# Patient Record
Sex: FEMALE | Race: WHITE | Employment: FULL TIME | URBAN - METROPOLITAN AREA
[De-identification: names, ages, dates, MRNs, and addresses within clinical notes are randomized per-mention and may not be internally consistent; named-entity substitution may affect disease eponyms.]

---

## 2017-03-15 ENCOUNTER — TELEPHONE (OUTPATIENT)
Dept: BARIATRICS/WEIGHT MGMT | Age: 37
End: 2017-03-15

## 2017-05-02 ENCOUNTER — TELEPHONE (OUTPATIENT)
Dept: BARIATRICS/WEIGHT MGMT | Age: 37
End: 2017-05-02

## 2017-05-09 ENCOUNTER — OFFICE VISIT (OUTPATIENT)
Dept: BARIATRICS/WEIGHT MGMT | Age: 37
End: 2017-05-09

## 2017-05-09 VITALS
SYSTOLIC BLOOD PRESSURE: 120 MMHG | HEART RATE: 73 BPM | WEIGHT: 243.2 LBS | HEIGHT: 66 IN | DIASTOLIC BLOOD PRESSURE: 80 MMHG | RESPIRATION RATE: 16 BRPM | BODY MASS INDEX: 39.08 KG/M2

## 2017-05-09 DIAGNOSIS — R06.83 SNORING: ICD-10-CM

## 2017-05-09 DIAGNOSIS — I10 HYPERTENSION, ESSENTIAL: ICD-10-CM

## 2017-05-09 DIAGNOSIS — E66.9 OBESITY (BMI 35.0-39.9 WITHOUT COMORBIDITY): ICD-10-CM

## 2017-05-09 DIAGNOSIS — E66.9 OBESITY (BMI 35.0-39.9 WITHOUT COMORBIDITY): Primary | ICD-10-CM

## 2017-05-09 DIAGNOSIS — E28.2 PCOS (POLYCYSTIC OVARIAN SYNDROME): ICD-10-CM

## 2017-05-09 PROBLEM — F43.10 POST TRAUMATIC STRESS DISORDER (PTSD): Status: ACTIVE | Noted: 2017-05-09

## 2017-05-09 PROBLEM — F41.9 ANXIETY: Status: ACTIVE | Noted: 2017-05-09

## 2017-05-09 LAB
BASOPHILS ABSOLUTE: 0.1 K/UL (ref 0–0.2)
BASOPHILS RELATIVE PERCENT: 1.3 %
EOSINOPHILS ABSOLUTE: 0.1 K/UL (ref 0–0.6)
EOSINOPHILS RELATIVE PERCENT: 1.7 %
HCT VFR BLD CALC: 45.7 % (ref 36–48)
HEMOGLOBIN: 15.2 G/DL (ref 12–16)
LYMPHOCYTES ABSOLUTE: 2 K/UL (ref 1–5.1)
LYMPHOCYTES RELATIVE PERCENT: 33 %
MCH RBC QN AUTO: 29.5 PG (ref 26–34)
MCHC RBC AUTO-ENTMCNC: 33.2 G/DL (ref 31–36)
MCV RBC AUTO: 88.8 FL (ref 80–100)
MONOCYTES ABSOLUTE: 0.5 K/UL (ref 0–1.3)
MONOCYTES RELATIVE PERCENT: 7.9 %
NEUTROPHILS ABSOLUTE: 3.3 K/UL (ref 1.7–7.7)
NEUTROPHILS RELATIVE PERCENT: 56.1 %
PDW BLD-RTO: 13.8 % (ref 12.4–15.4)
PLATELET # BLD: 310 K/UL (ref 135–450)
PMV BLD AUTO: 9 FL (ref 5–10.5)
RBC # BLD: 5.15 M/UL (ref 4–5.2)
TSH REFLEX: 1.55 UIU/ML (ref 0.27–4.2)
VITAMIN D 25-HYDROXY: 31.6 NG/ML
WBC # BLD: 5.9 K/UL (ref 4–11)

## 2017-05-09 PROCEDURE — 99244 OFF/OP CNSLTJ NEW/EST MOD 40: CPT | Performed by: SURGERY

## 2017-05-10 LAB
A/G RATIO: 1.7 (ref 1.1–2.2)
ALBUMIN SERPL-MCNC: 4.9 G/DL (ref 3.4–5)
ALP BLD-CCNC: 80 U/L (ref 40–129)
ALT SERPL-CCNC: 24 U/L (ref 10–40)
ANION GAP SERPL CALCULATED.3IONS-SCNC: 19 MMOL/L (ref 3–16)
AST SERPL-CCNC: 20 U/L (ref 15–37)
BILIRUB SERPL-MCNC: 0.3 MG/DL (ref 0–1)
BUN BLDV-MCNC: 13 MG/DL (ref 7–20)
CALCIUM SERPL-MCNC: 9.3 MG/DL (ref 8.3–10.6)
CHLORIDE BLD-SCNC: 98 MMOL/L (ref 99–110)
CHOLESTEROL, TOTAL: 233 MG/DL (ref 0–199)
CO2: 23 MMOL/L (ref 21–32)
CREAT SERPL-MCNC: 0.7 MG/DL (ref 0.6–1.1)
ESTIMATED AVERAGE GLUCOSE: 102.5 MG/DL
FOLATE: >20 NG/ML (ref 4.78–24.2)
GFR AFRICAN AMERICAN: >60
GFR NON-AFRICAN AMERICAN: >60
GLOBULIN: 2.9 G/DL
GLUCOSE BLD-MCNC: 79 MG/DL (ref 70–99)
HBA1C MFR BLD: 5.2 %
HDLC SERPL-MCNC: 45 MG/DL (ref 40–60)
IRON SATURATION: 21 % (ref 15–50)
IRON: 67 UG/DL (ref 37–145)
LDL CHOLESTEROL CALCULATED: 159 MG/DL
POTASSIUM SERPL-SCNC: 4.5 MMOL/L (ref 3.5–5.1)
SODIUM BLD-SCNC: 140 MMOL/L (ref 136–145)
TOTAL IRON BINDING CAPACITY: 318 UG/DL (ref 260–445)
TOTAL PROTEIN: 7.8 G/DL (ref 6.4–8.2)
TRIGL SERPL-MCNC: 144 MG/DL (ref 0–150)
VITAMIN B-12: 734 PG/ML (ref 211–911)
VLDLC SERPL CALC-MCNC: 29 MG/DL

## 2017-05-11 LAB — H PYLORI ANTIGEN STOOL: NEGATIVE

## 2017-05-30 ENCOUNTER — TELEPHONE (OUTPATIENT)
Dept: BARIATRICS/WEIGHT MGMT | Age: 37
End: 2017-05-30

## 2017-06-12 ENCOUNTER — OFFICE VISIT (OUTPATIENT)
Dept: BARIATRICS/WEIGHT MGMT | Age: 37
End: 2017-06-12

## 2017-06-12 VITALS
BODY MASS INDEX: 38.09 KG/M2 | WEIGHT: 237 LBS | HEIGHT: 66 IN | SYSTOLIC BLOOD PRESSURE: 122 MMHG | HEART RATE: 70 BPM | RESPIRATION RATE: 16 BRPM | DIASTOLIC BLOOD PRESSURE: 74 MMHG

## 2017-06-12 DIAGNOSIS — E28.2 PCOS (POLYCYSTIC OVARIAN SYNDROME): ICD-10-CM

## 2017-06-12 DIAGNOSIS — I10 HYPERTENSION, ESSENTIAL: ICD-10-CM

## 2017-06-12 DIAGNOSIS — E66.9 OBESITY (BMI 30-39.9): Primary | ICD-10-CM

## 2017-06-12 DIAGNOSIS — E78.5 HYPERLIPIDEMIA, UNSPECIFIED: ICD-10-CM

## 2017-06-12 PROCEDURE — 99213 OFFICE O/P EST LOW 20 MIN: CPT | Performed by: NURSE PRACTITIONER

## 2017-06-12 ASSESSMENT — ENCOUNTER SYMPTOMS
RESPIRATORY NEGATIVE: 1
EYES NEGATIVE: 1
ALLERGIC/IMMUNOLOGIC NEGATIVE: 1
GASTROINTESTINAL NEGATIVE: 1

## 2017-06-20 ENCOUNTER — TELEPHONE (OUTPATIENT)
Dept: SLEEP MEDICINE | Age: 37
End: 2017-06-20

## 2017-12-26 LAB
A/G RATIO: 1.7 (ref 1.1–2.2)
ABO/RH: NORMAL
ALBUMIN SERPL-MCNC: 4.5 G/DL (ref 3.4–5)
ALP BLD-CCNC: 52 U/L (ref 40–129)
ALT SERPL-CCNC: 31 U/L (ref 10–40)
ANION GAP SERPL CALCULATED.3IONS-SCNC: 13 MMOL/L (ref 3–16)
ANTIBODY SCREEN: NORMAL
AST SERPL-CCNC: 23 U/L (ref 15–37)
BILIRUB SERPL-MCNC: 0.3 MG/DL (ref 0–1)
BUN BLDV-MCNC: 14 MG/DL (ref 7–20)
CALCIUM SERPL-MCNC: 9.6 MG/DL (ref 8.3–10.6)
CHLORIDE BLD-SCNC: 105 MMOL/L (ref 99–110)
CO2: 25 MMOL/L (ref 21–32)
CREAT SERPL-MCNC: 0.6 MG/DL (ref 0.6–1.1)
GFR AFRICAN AMERICAN: >60
GFR NON-AFRICAN AMERICAN: >60
GLOBULIN: 2.7 G/DL
GLUCOSE BLD-MCNC: 90 MG/DL (ref 70–99)
HEPATITIS C ANTIBODY INTERPRETATION: NORMAL
POTASSIUM SERPL-SCNC: 4.2 MMOL/L (ref 3.5–5.1)
SODIUM BLD-SCNC: 143 MMOL/L (ref 136–145)
TOTAL PROTEIN: 7.2 G/DL (ref 6.4–8.2)
TSH SERPL DL<=0.05 MIU/L-ACNC: 2.02 UIU/ML (ref 0.27–4.2)
URIC ACID, SERUM: 3.7 MG/DL (ref 2.6–6)

## 2017-12-27 LAB
BASOPHILS ABSOLUTE: 0 K/UL (ref 0–0.2)
BASOPHILS RELATIVE PERCENT: 0.4 %
EOSINOPHILS ABSOLUTE: 0.3 K/UL (ref 0–0.6)
EOSINOPHILS RELATIVE PERCENT: 4.1 %
ESTIMATED AVERAGE GLUCOSE: 99.7 MG/DL
HBA1C MFR BLD: 5.1 %
HCT VFR BLD CALC: 40 % (ref 36–48)
HEMOGLOBIN: 13.5 G/DL (ref 12–16)
HEPATITIS B SURFACE ANTIGEN INTERPRETATION: NORMAL
HIV-1 AND HIV-2 ANTIBODIES: NORMAL
LYMPHOCYTES ABSOLUTE: 2.1 K/UL (ref 1–5.1)
LYMPHOCYTES RELATIVE PERCENT: 26.7 %
MCH RBC QN AUTO: 29.3 PG (ref 26–34)
MCHC RBC AUTO-ENTMCNC: 33.6 G/DL (ref 31–36)
MCV RBC AUTO: 87.2 FL (ref 80–100)
MONOCYTES ABSOLUTE: 0.7 K/UL (ref 0–1.3)
MONOCYTES RELATIVE PERCENT: 9.2 %
NEUTROPHILS ABSOLUTE: 4.7 K/UL (ref 1.7–7.7)
NEUTROPHILS RELATIVE PERCENT: 59.6 %
PDW BLD-RTO: 13 % (ref 12.4–15.4)
PLATELET # BLD: 324 K/UL (ref 135–450)
PMV BLD AUTO: 8.5 FL (ref 5–10.5)
RBC # BLD: 4.59 M/UL (ref 4–5.2)
RPR: NORMAL
RUBELLA ANTIBODY IGG: 152.7 IU/ML
WBC # BLD: 7.8 K/UL (ref 4–11)

## 2017-12-29 ENCOUNTER — HOSPITAL ENCOUNTER (OUTPATIENT)
Dept: OTHER | Age: 37
Discharge: HOME | End: 2017-12-29
Attending: OBSTETRICS & GYNECOLOGY | Admitting: OBSTETRICS & GYNECOLOGY

## 2017-12-29 LAB
24HR URINE VOLUME (ML): 2850 ML
CREATININE 24 HOUR URINE: 2.3 G/24HR (ref 0.6–1.5)
PROTEIN 24 HOUR URINE: 0.23 G/24HR

## 2018-03-09 LAB — TSH SERPL DL<=0.05 MIU/L-ACNC: 1.07 UIU/ML (ref 0.27–4.2)

## 2018-04-27 LAB
HCT VFR BLD CALC: 33.8 % (ref 36–48)
HEMOGLOBIN: 11.5 G/DL (ref 12–16)
MCH RBC QN AUTO: 29.6 PG (ref 26–34)
MCHC RBC AUTO-ENTMCNC: 34.1 G/DL (ref 31–36)
MCV RBC AUTO: 86.5 FL (ref 80–100)
PDW BLD-RTO: 13.5 % (ref 12.4–15.4)
PLATELET # BLD: 250 K/UL (ref 135–450)
PMV BLD AUTO: 8.8 FL (ref 5–10.5)
RBC # BLD: 3.9 M/UL (ref 4–5.2)
WBC # BLD: 8.5 K/UL (ref 4–11)

## 2018-06-08 LAB — TSH SERPL DL<=0.05 MIU/L-ACNC: 1.31 UIU/ML (ref 0.27–4.2)

## 2018-07-27 LAB
24HR URINE VOLUME (ML): 1800 ML
CREATININE 24 HOUR URINE: 1.5 G/24HR (ref 0.6–1.5)
PROTEIN 24 HOUR URINE: 0.16 G/24HR

## 2018-07-31 ENCOUNTER — HOSPITAL ENCOUNTER (OUTPATIENT)
Dept: OTHER | Age: 38
Discharge: HOME | End: 2018-07-31
Attending: OBSTETRICS & GYNECOLOGY | Admitting: OBSTETRICS & GYNECOLOGY

## 2018-07-31 PROBLEM — Z98.891 H/O CESAREAN SECTION: Status: ACTIVE | Noted: 2018-07-31

## 2018-07-31 PROBLEM — I10 CHRONIC HYPERTENSION: Status: ACTIVE | Noted: 2018-07-31

## 2018-07-31 PROBLEM — O16.9 HYPERTENSION AFFECTING PREGNANCY, ANTEPARTUM: Status: ACTIVE | Noted: 2018-07-31

## 2018-09-12 LAB — TSH SERPL DL<=0.05 MIU/L-ACNC: 2.34 UIU/ML (ref 0.27–4.2)

## 2018-09-14 LAB
HPV COMMENT: NORMAL
HPV TYPE 16: NOT DETECTED
HPV TYPE 18: NOT DETECTED
HPVOH (OTHER TYPES): NOT DETECTED

## 2018-11-28 ENCOUNTER — HOSPITAL ENCOUNTER (EMERGENCY)
Age: 38
Discharge: HOME | End: 2018-11-28
Attending: EMERGENCY MEDICINE
Payer: COMMERCIAL

## 2018-11-28 VITALS
DIASTOLIC BLOOD PRESSURE: 84 MMHG | SYSTOLIC BLOOD PRESSURE: 147 MMHG | WEIGHT: 241.4 LBS | OXYGEN SATURATION: 98 % | BODY MASS INDEX: 40.22 KG/M2 | TEMPERATURE: 98.3 F | HEIGHT: 65 IN | RESPIRATION RATE: 16 BRPM | HEART RATE: 90 BPM

## 2018-11-28 DIAGNOSIS — S40.021A HEMATOMA OF ARM, RIGHT, INITIAL ENCOUNTER: ICD-10-CM

## 2018-11-28 DIAGNOSIS — S50.11XA CONTUSION OF RIGHT FOREARM, INITIAL ENCOUNTER: Primary | ICD-10-CM

## 2018-11-28 PROCEDURE — 99282 EMERGENCY DEPT VISIT SF MDM: CPT

## 2018-11-28 ASSESSMENT — PAIN DESCRIPTION - ORIENTATION: ORIENTATION: RIGHT

## 2018-11-28 ASSESSMENT — PAIN SCALES - GENERAL: PAINLEVEL_OUTOF10: 1

## 2018-11-28 ASSESSMENT — PAIN DESCRIPTION - DESCRIPTORS: DESCRIPTORS: DULL

## 2018-11-28 ASSESSMENT — PAIN DESCRIPTION - FREQUENCY: FREQUENCY: INTERMITTENT

## 2018-11-28 ASSESSMENT — PAIN DESCRIPTION - LOCATION: LOCATION: ARM

## 2018-11-28 ASSESSMENT — PAIN DESCRIPTION - PAIN TYPE: TYPE: ACUTE PAIN

## 2019-07-03 LAB — TSH SERPL DL<=0.05 MIU/L-ACNC: 1.97 UIU/ML (ref 0.27–4.2)

## 2019-07-06 LAB
ORGANISM: ABNORMAL
URINE CULTURE, ROUTINE: ABNORMAL
URINE CULTURE, ROUTINE: ABNORMAL

## 2019-07-10 ENCOUNTER — HOSPITAL ENCOUNTER (OUTPATIENT)
Dept: PHYSICAL THERAPY | Age: 39
Setting detail: THERAPIES SERIES
Discharge: HOME | End: 2019-07-10
Payer: COMMERCIAL

## 2019-07-10 PROCEDURE — 97161 PT EVAL LOW COMPLEX 20 MIN: CPT

## 2019-07-10 PROCEDURE — 97112 NEUROMUSCULAR REEDUCATION: CPT

## 2019-07-10 PROCEDURE — 97530 THERAPEUTIC ACTIVITIES: CPT

## 2019-07-10 NOTE — FLOWSHEET NOTE
Physical Therapy Daily Treatment Note  Date:  7/10/2019    Patient Name:  Munira Hong    :  1980  MRN: [id number]  Restrictions/Precautions:    Medical/Treatment Diagnosis Information:   · Diagnosis: functional urinary incontinence  · Treatment Diagnosis: decreased pelvic floor coordination and strength    Tracking Information:  Physician Information Referring Practitioner: Dr. Yesi Baldwin of Care Sent Date: 7/10 Signed Received:    Visit Count / Total Visits      Insurance Approved Visits  /  Approved Dates:     Insurance Information PT Insurance Information: anthem     Progress Note/G-codes   []  Yes  [x]  No Next Due:      Pain level: 0/10     Subjective:  See eval    Objective:   Observation:   Test measurements:      Exercises:  Exercise/Equipment Resistance/Repetitions Other comments   Hold/relax 3'/10' x 10  4'/10' x 10    Quick flicks  Every 5' x 10              Swiss ball     cables     Gliders                                            Other Therapeutic Activities: 7/10: Pt taught normal bladder health and bladder irritants     Home Exercise Program: 7/10: Pt issued sensor to perform hold/relax and taught to perform ex at above, pt taught to perform pelvic floor contraction using ball as feedback.       Manual Treatments:      Modalities:      Timed Code Treatment Minutes:  40    Total Treatment Minutes:  54    Treatment/Activity Tolerance:  [x] Patient tolerated treatment well [] Patient limited by fatigue  [] Patient limited by pain  [] Patient limited by other medical complications  [] Other:     Prognosis: [x] Good [] Fair  [] Poor    Patient Requires Follow-up: [x] Yes  [] No    Plan:   [] Continue per plan of care [] Alter current plan (see comments)  [x] Plan of care initiated [] Hold pending MD visit [] Discharge  Plan for Next Session:   Biofeedback, strengthening    Electronically signed by:  Miguel Ángel Marlow, PT

## 2019-07-22 ENCOUNTER — HOSPITAL ENCOUNTER (OUTPATIENT)
Dept: PHYSICAL THERAPY | Age: 39
Setting detail: THERAPIES SERIES
Discharge: HOME | End: 2019-07-22
Payer: COMMERCIAL

## 2019-07-22 PROCEDURE — 97112 NEUROMUSCULAR REEDUCATION: CPT

## 2019-07-30 ENCOUNTER — APPOINTMENT (OUTPATIENT)
Dept: PHYSICAL THERAPY | Age: 39
End: 2019-07-30
Payer: COMMERCIAL

## 2019-08-12 ENCOUNTER — HOSPITAL ENCOUNTER (OUTPATIENT)
Dept: PHYSICAL THERAPY | Age: 39
Setting detail: THERAPIES SERIES
Discharge: HOME | End: 2019-08-12
Payer: COMMERCIAL

## 2019-08-12 PROCEDURE — 97112 NEUROMUSCULAR REEDUCATION: CPT

## 2019-08-12 PROCEDURE — 97110 THERAPEUTIC EXERCISES: CPT

## 2019-08-12 NOTE — FLOWSHEET NOTE
Physical Therapy Daily Treatment Note  Date:  2019    Patient Name:  Joseph Alexandra    :  1980  MRN: [id number]  Restrictions/Precautions:    Medical/Treatment Diagnosis Information:   · Diagnosis: functional urinary incontinence  · Treatment Diagnosis: decreased pelvic floor coordination and strength    Tracking Information:  Physician Information Referring Practitioner: Dr. Valera Severe of Care Sent Date: 7/10 Signed Received:    Visit Count / Total Visits  3/7    Insurance Approved Visits  /  Approved Dates:     Insurance Information PT Insurance Information: betty     Progress Note/G-codes   []  Yes  [x]  No Next Due:      Pain level: 0/10     Subjective:  Reports she has had a rough couple of days the last few weeks. Is really struggling with leakage and having to go home after leaking. Started weight watchers and is down some weight. She is adding some ex like bridges, core strengthening. Is going more than every 2 hours. Has ordered a device to help her with training for this. Objective:   Observation: sensor coming out at times/difficulty with relax  Test measurements:      Exercises:  Exercise/Equipment Resistance/Repetitions Other comments   Hold/relax 1 x (5'/10' x 10)  2 x (6'/10' x 10) 1 x hold at 15  1 x (7'/10' x 10) Difficulty bringing the number down during the relax    Quick flicks  Every 5' x 10 after each above              Swiss ball A/P, M/L, CW/CCW x 10   Marching x 10  Alt UE/LE lift x 10    cables Walkouts 3 pl x 3 each dir    Gliders  Hip abd/ext x 10                                          Other Therapeutic Activities: 7/10: Pt taught normal bladder health and bladder irritants     Home Exercise Program: 7/10: Pt issued sensor to perform hold/relax and taught to perform ex at above, pt taught to perform pelvic floor contraction using ball as feedback.       Manual Treatments:      Modalities:      Timed Code Treatment Minutes:  59    Total Treatment

## 2019-08-22 ENCOUNTER — HOSPITAL ENCOUNTER (OUTPATIENT)
Dept: PHYSICAL THERAPY | Age: 39
Setting detail: THERAPIES SERIES
Discharge: HOME | End: 2019-08-22
Payer: COMMERCIAL

## 2019-08-29 ENCOUNTER — HOSPITAL ENCOUNTER (OUTPATIENT)
Dept: PHYSICAL THERAPY | Age: 39
Setting detail: THERAPIES SERIES
Discharge: HOME | End: 2019-08-29
Payer: COMMERCIAL

## 2019-08-29 PROCEDURE — 97112 NEUROMUSCULAR REEDUCATION: CPT

## 2019-08-29 PROCEDURE — 97110 THERAPEUTIC EXERCISES: CPT

## 2019-09-05 ENCOUNTER — HOSPITAL ENCOUNTER (OUTPATIENT)
Dept: PHYSICAL THERAPY | Age: 39
Setting detail: THERAPIES SERIES
Discharge: HOME | End: 2019-09-05
Payer: COMMERCIAL

## 2019-09-05 PROCEDURE — 97110 THERAPEUTIC EXERCISES: CPT

## 2019-09-05 PROCEDURE — 97112 NEUROMUSCULAR REEDUCATION: CPT

## 2021-06-20 LAB
ORGANISM: ABNORMAL
URINE CULTURE, ROUTINE: ABNORMAL

## 2022-10-28 ENCOUNTER — TELEPHONE (OUTPATIENT)
Dept: BARIATRICS/WEIGHT MGMT | Age: 42
End: 2022-10-28

## 2022-10-28 NOTE — TELEPHONE ENCOUNTER
Patient is returning call from [employer]. Patient signed up for digital seminar for Patricia 11/1/22.

## 2022-11-16 ENCOUNTER — TELEPHONE (OUTPATIENT)
Dept: BARIATRICS/WEIGHT MGMT | Age: 42
End: 2022-11-16

## 2022-11-16 NOTE — TELEPHONE ENCOUNTER
Patient was sent Dr. Olegario Cao digital bariatric seminar. Patient DOES have BWLS coverage with [employer] (no specified diet) Bariatric benefit form scanned in media.       *Spoke with patient, Info given  Per pt: No HX of WLS, BMI > 35  Pk mailed

## 2023-01-13 ENCOUNTER — TELEPHONE (OUTPATIENT)
Dept: BARIATRICS/WEIGHT MGMT | Age: 43
End: 2023-01-13

## 2023-01-13 NOTE — TELEPHONE ENCOUNTER
Called as a new pt courtesy call - spoke with patient. Emailed paperwork again did not get mail - old patient to have new pt paperwork completely filled out, insurance card, and id and to arrive on time at [facility] location. If they didn't have the paperwork filled out and arrive on time may be rescheduled.

## 2023-01-17 ENCOUNTER — HOSPITAL ENCOUNTER (OUTPATIENT)
Age: 43
Discharge: HOME | End: 2023-01-17
Payer: COMMERCIAL

## 2023-01-17 ENCOUNTER — OFFICE VISIT (OUTPATIENT)
Dept: BARIATRICS/WEIGHT MGMT | Age: 43
End: 2023-01-17
Payer: COMMERCIAL

## 2023-01-17 VITALS
WEIGHT: 250.6 LBS | SYSTOLIC BLOOD PRESSURE: 130 MMHG | HEIGHT: 66 IN | DIASTOLIC BLOOD PRESSURE: 78 MMHG | RESPIRATION RATE: 18 BRPM | OXYGEN SATURATION: 97 % | HEART RATE: 58 BPM | BODY MASS INDEX: 40.27 KG/M2

## 2023-01-17 DIAGNOSIS — E78.2 MIXED HYPERLIPIDEMIA: ICD-10-CM

## 2023-01-17 DIAGNOSIS — E66.01 MORBID OBESITY WITH BMI OF 40.0-44.9, ADULT (HCC): ICD-10-CM

## 2023-01-17 DIAGNOSIS — E66.01 MORBID OBESITY WITH BMI OF 40.0-44.9, ADULT (HCC): Primary | ICD-10-CM

## 2023-01-17 DIAGNOSIS — I10 CHRONIC HYPERTENSION: ICD-10-CM

## 2023-01-17 DIAGNOSIS — E28.2 PCOS (POLYCYSTIC OVARIAN SYNDROME): ICD-10-CM

## 2023-01-17 DIAGNOSIS — K21.9 CHRONIC GERD: ICD-10-CM

## 2023-01-17 DIAGNOSIS — Z01.818 PREOPERATIVE CLEARANCE: ICD-10-CM

## 2023-01-17 LAB
A/G RATIO: 1.5 (ref 1.1–2.2)
ALBUMIN SERPL-MCNC: 4.4 G/DL (ref 3.4–5)
ALP BLD-CCNC: 68 U/L (ref 40–129)
ALT SERPL-CCNC: 20 U/L (ref 10–40)
ANION GAP SERPL CALCULATED.3IONS-SCNC: 15 MMOL/L (ref 3–16)
AST SERPL-CCNC: 19 U/L (ref 15–37)
BASOPHILS ABSOLUTE: 0 K/UL (ref 0–0.2)
BASOPHILS RELATIVE PERCENT: 0.6 %
BILIRUB SERPL-MCNC: 0.4 MG/DL (ref 0–1)
BUN BLDV-MCNC: 15 MG/DL (ref 7–20)
CALCIUM SERPL-MCNC: 9.2 MG/DL (ref 8.3–10.6)
CHLORIDE BLD-SCNC: 104 MMOL/L (ref 99–110)
CHOLESTEROL, TOTAL: 196 MG/DL (ref 0–199)
CO2: 23 MMOL/L (ref 21–32)
CREAT SERPL-MCNC: 0.8 MG/DL (ref 0.6–1.1)
EOSINOPHILS ABSOLUTE: 0.1 K/UL (ref 0–0.6)
EOSINOPHILS RELATIVE PERCENT: 1.8 %
FOLATE: 17.27 NG/ML (ref 4.78–24.2)
GFR SERPL CREATININE-BSD FRML MDRD: >60 ML/MIN/{1.73_M2}
GLUCOSE BLD-MCNC: 87 MG/DL (ref 70–99)
HCT VFR BLD CALC: 44.4 % (ref 36–48)
HDLC SERPL-MCNC: 35 MG/DL (ref 40–60)
HEMOGLOBIN: 14.3 G/DL (ref 12–16)
INR BLD: 1.03 (ref 0.87–1.14)
IRON SATURATION: 37 % (ref 15–50)
IRON: 107 UG/DL (ref 37–145)
LDL CHOLESTEROL CALCULATED: 125 MG/DL
LYMPHOCYTES ABSOLUTE: 1.3 K/UL (ref 1–5.1)
LYMPHOCYTES RELATIVE PERCENT: 30.1 %
MCH RBC QN AUTO: 28.4 PG (ref 26–34)
MCHC RBC AUTO-ENTMCNC: 32.3 G/DL (ref 31–36)
MCV RBC AUTO: 88 FL (ref 80–100)
MONOCYTES ABSOLUTE: 0.4 K/UL (ref 0–1.3)
MONOCYTES RELATIVE PERCENT: 9.3 %
NEUTROPHILS ABSOLUTE: 2.6 K/UL (ref 1.7–7.7)
NEUTROPHILS RELATIVE PERCENT: 58.2 %
PDW BLD-RTO: 13 % (ref 12.4–15.4)
PLATELET # BLD: 273 K/UL (ref 135–450)
PMV BLD AUTO: 8.8 FL (ref 5–10.5)
POTASSIUM SERPL-SCNC: 4.4 MMOL/L (ref 3.5–5.1)
PROTHROMBIN TIME: 13.4 SEC (ref 11.7–14.5)
RBC # BLD: 5.04 M/UL (ref 4–5.2)
SODIUM BLD-SCNC: 142 MMOL/L (ref 136–145)
TOTAL IRON BINDING CAPACITY: 291 UG/DL (ref 260–445)
TOTAL PROTEIN: 7.4 G/DL (ref 6.4–8.2)
TRIGL SERPL-MCNC: 178 MG/DL (ref 0–150)
TSH REFLEX: 1.85 UIU/ML (ref 0.27–4.2)
VITAMIN B-12: 508 PG/ML (ref 211–911)
VITAMIN D 25-HYDROXY: 34.4 NG/ML
VLDLC SERPL CALC-MCNC: 36 MG/DL
WBC # BLD: 4.5 K/UL (ref 4–11)

## 2023-01-17 PROCEDURE — 84446 ASSAY OF VITAMIN E: CPT

## 2023-01-17 PROCEDURE — 82607 VITAMIN B-12: CPT

## 2023-01-17 PROCEDURE — 84425 ASSAY OF VITAMIN B-1: CPT

## 2023-01-17 PROCEDURE — 80061 LIPID PANEL: CPT

## 2023-01-17 PROCEDURE — 3078F DIAST BP <80 MM HG: CPT | Performed by: SURGERY

## 2023-01-17 PROCEDURE — 83036 HEMOGLOBIN GLYCOSYLATED A1C: CPT

## 2023-01-17 PROCEDURE — 82746 ASSAY OF FOLIC ACID SERUM: CPT

## 2023-01-17 PROCEDURE — 83540 ASSAY OF IRON: CPT

## 2023-01-17 PROCEDURE — 82306 VITAMIN D 25 HYDROXY: CPT

## 2023-01-17 PROCEDURE — 85025 COMPLETE CBC W/AUTO DIFF WBC: CPT

## 2023-01-17 PROCEDURE — 85610 PROTHROMBIN TIME: CPT

## 2023-01-17 PROCEDURE — 84590 ASSAY OF VITAMIN A: CPT

## 2023-01-17 PROCEDURE — 80053 COMPREHEN METABOLIC PANEL: CPT

## 2023-01-17 PROCEDURE — 3075F SYST BP GE 130 - 139MM HG: CPT | Performed by: SURGERY

## 2023-01-17 PROCEDURE — 99205 OFFICE O/P NEW HI 60 MIN: CPT | Performed by: SURGERY

## 2023-01-17 PROCEDURE — 36415 COLL VENOUS BLD VENIPUNCTURE: CPT

## 2023-01-17 PROCEDURE — 84443 ASSAY THYROID STIM HORMONE: CPT

## 2023-01-17 PROCEDURE — 83550 IRON BINDING TEST: CPT

## 2023-01-17 NOTE — PATIENT INSTRUCTIONS
Patient received dietary handouts and education. Goals:   -Eat 4-5 times daily  -Avoid high fat and high sugar foods  -Include protein with all meals and snacks  -Avoid carbonation and caffeine  -Avoid calorie containing beverages  -Increase physical activity as tolerated      -Plan for Laparoscopic sleeve gastrectomy      Pre-operative work up Ordered:    - F/U in 4 weeks. - Nutrition Labs. - Protein Shake Trial.  - Psych Evaluation.   - Cardiac Clearance. - EGD (endoscopy to check your stomach). - Support Group Attendance. - Obtain letter of medical necessity (PCP Letter). - Quit Smoking,  Alcohol, Caffeine and Carbonated Drinks  - Obtain records for Weight History 2 yrs. - Start Regular Exercise and track your activities. - Start Tracking your food Intake and follow dietary guidelines. - Avoid Pregnancy for 2 yrs from date of surgery. (for female patients in childbearing age)  - Referral to [address] [place] [address].         Patient advised that its their responsibility to follow up for studies, referrals and/or labs ordered today.

## 2023-01-17 NOTE — PROGRESS NOTES
[facility]) Physicians   [facility]  Lilly Fletcher MD, [facility], [address], [address] [facility] [address] . Phone: [phone]  Fax: [phone]       Chief Complaint   Patient presents with    Bariatric, Initial Visit     NP REBECCA BCARYAN           HPI:    Nelly Harden is a very pleasant 43 y.o. obese female ,   Body mass index is 40.45 kg/m². And multiple medical problems who is presenting for weight loss surgery evaluation and consultation by Dr. Monty Lundy. Patient has been struggling for several years now with obesity. Patient feels the weight is an obstacle to achieve and perform things in daily living as well risk on health. Tries to diet, and exercise but can't keep the weight off. Patient tried [employer], calorie restriction and noom. Patient has not participated in meal replacement/liquid diets. Patient has not participated in weight loss medications and other regimens, but with no sustainable weight loss. Patient  is very determined to lose weight and be healthy, and is interested in surgical weight loss for future weight loss. .    Otherwise patient denies any nausea, vomiting, fevers, chills, shortness of breath, chest pain, constipation or urinary symptoms.         Obesity related problems [place] is dealing with:  Patient Active Problem List    Diagnosis Date Noted    Preoperative clearance 2023     Priority: Medium    Morbid obesity with BMI of 40.0-44.9, adult ([address] 2023     Priority: Medium    Chronic GERD 2023     Priority: Medium    Hypertension affecting pregnancy, antepartum 2018    H/O  section 2018    Chronic hypertension 2018    Mixed hyperlipidemia 2017    PCOS (polycystic ovarian syndrome) 2017    Anxiety 2017    Post traumatic stress disorder (PTSD) 2017    Obesity (BMI 30-39.9) 2017    Snoring 2017           Pain Assessment   Denies any abdominal pain     Past Medical History:   Diagnosis Date    Depression     Diabetes mellitus ([facility] [address]     gestational- on metformin    Hypertension     chronic htn- on meds    Obesity (BMI 35.0-39.9 without comorbidity) 2017    PCOS (polycystic ovarian syndrome) 2017    Thyroid disease     hx of hypothyroid- on synthroid    Urinary incontinence      Past Surgical History:   Procedure Laterality Date     SECTION      WISDOM TOOTH EXTRACTION       Family History   Problem Relation Age of Onset    Mental Illness Mother     Elevated Lipids Mother     Arthritis Mother     Cancer Mother     Depression Mother     High Blood Pressure Mother     Miscarriages / [place] Mother     Mental Illness Father     Arthritis Father     Hearing Loss Father     High Blood Pressure Father     Alcohol Abuse Father     Osteoporosis Father     Birth Defects Brother     Depression Brother     Mental Illness Brother     Diabetes Maternal Grandfather     Heart Disease Maternal Grandfather     Coronary Art Dis Maternal Grandfather     Heart Disease Paternal Grandmother     Stroke Paternal Grandmother     Vision Loss Paternal Grandmother     Mental Illness Paternal Grandmother     Alcohol Abuse Paternal Grandfather      Social History     Tobacco Use    Smoking status: Never    Smokeless tobacco: Never   Substance Use Topics    Alcohol use: Yes         I counseled the patient on the risks of Smoking, ETOH or Drug use, and importance of completely avoiding them, otherwise patient risks surgery cancellation or post operative serious complications if they start using any. Paulie Pelletier acknowledged, agreed not to use and wants to proceed.             Allergies   Allergen Reactions    Latex Rash    Vicodin [Hydrocodone-Acetaminophen] Nausea And Vomiting     Vitals:    23 0934   BP: 130/78   Pulse: 58   Resp: 18   SpO2: 97%   Weight: 250 lb 9.6 oz (113.7 kg)   Height: 5' 6\" (1.676 m)       Body mass index is 40.45 kg/m². Current Outpatient Medications:     Levonorgestrel (MIRENA, 52 MG, IU), by IntraUTERine route, Disp: , Rfl:     Prenatal Vit-Fe Fumarate-FA (PRENATAL VITAMIN PO), Take by mouth, Disp: , Rfl:     NIFEdipine (ADALAT CC) 60 MG CR tablet, Take 1 tablet by mouth daily. , Disp: 30 tablet, Rfl: 3    LEVOTHYROXINE SODIUM PO, Take 25 mcg by mouth daily. , Disp: , Rfl:       Review of Systems - History obtained from the patient  General ROS: overweight   Psychological ROS: negative  Ophthalmic ROS: negative  Neurological ROS: negative  ENT ROS: negative  Allergy and Immunology ROS: negative  Hematological and Lymphatic ROS: negative  Endocrine ROS: overweight  Breast ROS: negative  Respiratory ROS: negative  Cardiovascular ROS: negative  Gastrointestinal ROS:negative  Genito-Urinary ROS: negative  Musculoskeletal ROS: weight effects on joints  Skin ROS: negative    Physical Exam   Constitutional: Patient is oriented to person, place, and time. Vital signs are normal. Patient  appears well-developed and well-nourished. Patient  is active and cooperative. Non-toxic appearance. No distress. HENT:   Head: Normocephalic and atraumatic. Head is without laceration. Right Ear: External ear normal. No lacerations. No drainage, swelling or tenderness. Left Ear: External ear normal. No lacerations. No drainage, swelling or tenderness. Nose/Mouth/Throat: Patient is wearing mask due to Covid-19 pandemic precautions, following CDC and health authorities guidelines. Eyes: Conjunctivae, EOM and lids are normal. Pupils are equal, round, and reactive to light. Right eye exhibits no discharge. No foreign body present in the right eye. Left eye exhibits no discharge. No foreign body present in the left eye. No scleral icterus. Neck: Trachea normal and normal range of motion. Neck supple. No JVD present. No tracheal tenderness present. Carotid bruit is not present. No rigidity.  No tracheal deviation and no edema [General Appearance - Well Developed] : well developed [General Appearance - Well Nourished] : well nourished present. No thyromegaly present. Cardiovascular: Normal rate, regular rhythm, normal heart sounds, intact distal pulses and normal pulses. Pulmonary/Chest: Effort normal and breath sounds normal. No stridor. No respiratory distress. Patient  has no wheezes. Patient has no rales. Patient exhibits no tenderness and no crepitus. Abdominal: Soft. Normal appearance and bowel sounds are normal. Patient exhibits no distension, no abdominal bruit, no ascites and no mass. There is no hepatosplenomegaly. There is no tenderness. There is no rigidity, no rebound, no guarding and no CVA tenderness. No hernia. Hernia confirmed negative in the ventral area. Musculoskeletal: Normal range of motion. Patient exhibits no edema or tenderness. Lymphadenopathy:        Head (right side): No submental, no submandibular, no preauricular, no posterior auricular and no occipital adenopathy present. Head (left side): No submental, no submandibular, no preauricular, no posterior auricular and no occipital adenopathy present. Patient  has no cervical adenopathy. Right: No supraclavicular adenopathy present. Left: No supraclavicular adenopathy present. Neurological: Patient is alert and oriented to person, place, and time. Patient has normal strength. Coordination and gait normal. GCS eye subscore is 4. GCS verbal subscore is 5. GCS motor subscore is 6. Skin: Skin is warm and dry. No abrasion and no rash noted. Patient  is not diaphoretic. No cyanosis or erythema. Psychiatric: Patient has a normal mood and affect. speech is normal and behavior is normal. Cognition and memory are normal.         Simi Davis was seen today for bariatric, initial visit. Diagnoses and all orders for this visit:    Morbid obesity with BMI of 40.0-44.9, adult ([facility] [address]  -     CBC with Auto Differential; Future  -     Comprehensive Metabolic Panel; Future  -     Hemoglobin A1C; Future  -     Iron and TIBC;  Future  -     Lipid Panel; Future  -     TSH with Reflex; Future  -     Vitamin A; Future  -     Vitamin B1, Whole Blood; Future  -     Vitamin B12 & Folate; Future  -     Vitamin D 25 Hydroxy; Future  -     Vitamin E; Future  -     Protime-INR; Future  -     Ambulatory referral to Cardiology    Preoperative clearance  -     CBC with Auto Differential; Future  -     Comprehensive Metabolic Panel; Future  -     Hemoglobin A1C; Future  -     Iron and TIBC; Future  -     Lipid Panel; Future  -     TSH with Reflex; Future  -     Vitamin A; Future  -     Vitamin B1, Whole Blood; Future  -     Vitamin B12 & Folate; Future  -     Vitamin D 25 Hydroxy; Future  -     Vitamin E; Future  -     Protime-INR; Future  -     Ambulatory referral to Cardiology    PCOS (polycystic ovarian syndrome)  -     CBC with Auto Differential; Future  -     Comprehensive Metabolic Panel; Future  -     Hemoglobin A1C; Future  -     Iron and TIBC; Future  -     Lipid Panel; Future  -     TSH with Reflex; Future  -     Vitamin A; Future  -     Vitamin B1, Whole Blood; Future  -     Vitamin B12 & Folate; Future  -     Vitamin D 25 Hydroxy; Future  -     Vitamin E; Future  -     Protime-INR; Future  -     Ambulatory referral to Cardiology    Chronic hypertension  -     CBC with Auto Differential; Future  -     Comprehensive Metabolic Panel; Future  -     Hemoglobin A1C; Future  -     Iron and TIBC; Future  -     Lipid Panel; Future  -     TSH with Reflex; Future  -     Vitamin A; Future  -     Vitamin B1, Whole Blood; Future  -     Vitamin B12 & Folate; Future  -     Vitamin D 25 Hydroxy; Future  -     Vitamin E; Future  -     Protime-INR; Future  -     Ambulatory referral to Cardiology    Mixed hyperlipidemia  -     CBC with Auto Differential; Future  -     Comprehensive Metabolic Panel; Future  -     Hemoglobin A1C; Future  -     Iron and TIBC; Future  -     Lipid Panel; Future  -     TSH with Reflex; Future  -     Vitamin A; Future  -     Vitamin B1, Whole Blood;  Future  - [Normal Appearance] : normal appearance Vitamin B12 & Folate; Future  -     Vitamin D 25 Hydroxy; Future  -     Vitamin E; Future  -     Protime-INR; Future  -     Ambulatory referral to Cardiology    Chronic GERD  -     CBC with Auto Differential; Future  -     Comprehensive Metabolic Panel; Future  -     Hemoglobin A1C; Future  -     Iron and TIBC; Future  -     Lipid Panel; Future  -     TSH with Reflex; Future  -     Vitamin A; Future  -     Vitamin B1, Whole Blood; Future  -     Vitamin B12 & Folate; Future  -     Vitamin D 25 Hydroxy; Future  -     Vitamin E; Future  -     Protime-INR; Future  -     Ambulatory referral to Cardiology          A/P  Carine Osobrne is a very pleasant 43 y.o. female with Obesity,  Body mass index is 40.45 kg/m². and multiple obesity related co-morbidities. Carine Osborne is very motivated to lose weight and being more healthy. We discussed how her weight affects her overall health including:  Patient Active Problem List    Diagnosis Date Noted    Preoperative clearance 2023     Priority: Medium    Morbid obesity with BMI of 40.0-44.9, adult ([facility] [address] 2023     Priority: Medium    Chronic GERD 2023     Priority: Medium    Hypertension affecting pregnancy, antepartum 2018    H/O  section 2018    Chronic hypertension 2018    Mixed hyperlipidemia 2017    PCOS (polycystic ovarian syndrome) 2017    Anxiety 2017    Post traumatic stress disorder (PTSD) 2017    Obesity (BMI 30-39.9) 2017    Snoring 2017         The patient underwent extensive dietary counseling with the registered dietitian. I have reviewed, discussed and agree with the dietary plan. Medical weight loss and different surgical options were discussed in details with patient. Miles Barraza is interested in surgical weight loss for future weight loss. Case volume and outcomes data in our program were discussed and reviewed with the patient.   Questions and concerns were [Well Groomed] : well groomed [General Appearance - In No Acute Distress] : no acute distress addressed today. Patient is interested in Laparoscopic Sleeve Gastrectomy, which I believe is an excellent option. I explained to the patient that surgery does carry a risk specially with the coexisting comorbid conditions the patient have. Surgery as well in obese patiens can carry more risk. Risks including but not limited to; Infection, bleeding, gastric leak or obstruction, persistent nausea, vomiting, or reflux, injury to surrounding structures, risks of anesthesia, stricture, delayed gastric emptying, staple line leak, incisional hernia, malnutrition , vitamin deficiency, neurological complications, paralysis, hair loss, and/ or Conversion to Open surgery may be necessary. Failure to lose or maintain weight loss, Gallstones or Kidney Stones, Deep Venous Thrombosis , pulmonary embolism and / or death. However I do believe the benefits outweighs that risk. Carine American understands the risks and wants to proceed. We will proceed with pre-operative work up labs and studies. Will also petition patient's  insurance for approval for this procedure. I advised the patient that we can't guarantee final insurance approval.      Patient received dietary handouts and education. Patient advised that its their responsibility to follow up for studies, referrals and/or labs ordered today. Also discussed in details the importance of follow up, as well following the recommendations and completing the whole program to improve outcomes when it comes to healthier lifestyle as well weight loss. Patient also advised about risks and benefits being on a strict dietary regimen as well using supplements.  Patient agrees and wants to proceed with weight loss planning     Today's encounter included any number of the following: Bariatric Pre/Post operative work up/protocols, review of labs, imaging, provider notes, outside hospital records, performing examination/evaluation, counseling patient and/or family, [Bowel Sounds] : normal bowel sounds ordering medications/tests, placing referrals and communication with referring physicians, coordination of care; discussing dietary plan/recall with the patient as well with registered dietitian and documentation in the EHR. Of note, the above was done during same day of the actual patient encounter. Obesity as a disease is considered a high risk to patients overall health and should therefore be considered a high risk disease state. Advised the patient that not getting there weight under control (weight loss hopefully will help with resolving/improving of the comorbid conditions),  that could increase risk of complications/worsening of those conditions on the long-term. With Covid-19 pandemic, CDC and health authorities did classify obese patients as vulnerable and high risk as well. Which makes weight loss a priority for improvement of their wellbeing and overall health. CDC has issued the following statement as far Obese patients being at Increased Risk of being critically ill from SARS-Cov-2  \"Severe obesity increases the risk of a serious breathing problem called acute respiratory distress syndrome (ARDS), which is a major complication of GEOPE-46 and can cause difficulties with a doctors ability to provide respiratory support for seriously ill patients. People living with severe obesity can have multiple serious chronic diseases and underlying health conditions that can increase the risk of severe illness from COVID-19. \"      I did explain thoroughly to the patient that compliance with pre- and post op diet and other recommendations are integral part to improve the chances of successful weight loss and also not following it could end with serious health complications. Also stressed to the patient importance of taking the multivitamins as instructed, otherwise risk significant complications. Patient Instructions   Patient received dietary handouts and education.     Goals:   -Eat 4-5 [Abdomen Soft] : soft times daily  -Avoid high fat and high sugar foods  -Include protein with all meals and snacks  -Avoid carbonation and caffeine  -Avoid calorie containing beverages  -Increase physical activity as tolerated      -Plan for Laparoscopic sleeve gastrectomy      Pre-operative work up Ordered:    - F/U in 4 weeks. - Nutrition Labs. - Protein Shake Trial.  - Psych Evaluation.   - Cardiac Clearance. - EGD (endoscopy to check your stomach). - Support Group Attendance. - Obtain letter of medical necessity (PCP Letter). - Quit Smoking,  Alcohol, Caffeine and Carbonated Drinks  - Obtain records for Weight History 2 yrs. - Start Regular Exercise and track your activities. - Start Tracking your food Intake and follow dietary guidelines. - Avoid Pregnancy for 2 yrs from date of surgery. (for female patients in childbearing age)  - Referral to [address] [place] [address].         Patient advised that its their responsibility to follow up for studies, referrals and/or labs ordered today. Please note that some or all of this report was generated using voice recognition software. Please notify me in case of any questions about the content of this document, as some errors in transcription may have occurred . [Abdomen Tenderness] : non-tender [Abdomen Mass (___ Cm)] : no abdominal mass palpated [Costovertebral Angle Tenderness] : no ~M costovertebral angle tenderness [Urethral Meatus] : meatus normal [Urinary Bladder Findings] : the bladder was normal on palpation [Penis Abnormality] : normal uncircumcised penis [Scrotum] : the scrotum was normal [Epididymis] : the epididymides were normal [Testes Tenderness] : no tenderness of the testes [Testes Mass (___cm)] : there were no testicular masses [Anus Abnormality] : the anus and perineum were normal [Rectal Exam - Rectum] : digital rectal exam was normal [Prostate Tenderness] : the prostate was not tender [No Prostate Nodules] : no prostate nodules [Prostate Size ___ gm] : prostate size [unfilled] gm [] : no rash [Edema] : no peripheral edema [Oriented To Time, Place, And Person] : oriented to person, place, and time [Affect] : the affect was normal [Not Anxious] : not anxious [Mood] : the mood was normal [Normal Station and Gait] : the gait and station were normal for the patient's age [No Focal Deficits] : no focal deficits [FreeTextEntry1] : S1-S2 normal without murmur or gallop.

## 2023-01-17 NOTE — PROGRESS NOTES
Kyleigh Huerta is a 43 y.o. female with a date of birth of 1980. Vitals:    01/17/23 0934   BP: 130/78   Pulse: 58   Resp: 18   SpO2: 97%    BMI: Body mass index is 40.45 kg/m². Obesity Classification: Class III    Weight History: Wt Readings from Last 3 Encounters:   01/17/23 250 lb 9.6 oz (113.7 kg)   11/28/18 241 lb 6.4 oz (109.5 kg)   07/31/18 254 lb (115.2 kg)     Patient's lowest adult weight was 186 lbs at age 44. Patient's highest adult weight was 256 lbs at age 43. Patient has participated in the following weight loss programs: Weight Watcher Anonymous, calorie restriction and noom. Patient has not participated in meal replacement/liquid diets. Patient has not participated in weight loss medications. Patient is  lactose intolerant. Patient does have food allergies for pineapple, banana, avocado, hazelnut) Patient does not have Congregational/cultural food preferences. Patient does tolerate artificial sweeteners in small amounts - splenda and stevia is best tolerated, aspartame causes diarrhea. 24 hour recall/food frequency chart: Pts  present today, does most shopping/cooking. Works 8 AM- 5 PM, at home 2 days and in the office 3 days. Breakfast: Protein shake made w/ whey isolate powder, 14 oz coffee, sf syrup/creamer  Snack: None  Lunch: Out- chix/chorizzo/ cheese/sindhu/sc + 1 cup [place] rice + chips/salsa  Snack: None  Dinner: Grilled turkey/navarrete sandwich w/ cheese + tomato soup w/ crackers  Snack: None  Drinks throughout the day: water, 4 oz juice, soda and carbonated water 1/day, coffee w/ protein powder  Do you drink alcohol? Yes. How often/how much alcohol do you drink: 3-4 wine or judy mixed drinks per week. Patient does meet the criteria for binge eating disorder. Patient does have grazing. Patient does not have night eating. Patient does have a history of emotional eating or eating out of boredom.     Physical Activity: walk/ weighted hula hoop/ dance 2-3X/week    Surgery  Patient does feel confident in her ability to make these changes. The patient's expectations of post-surgical eating habits realistic. Patient states she does understand the consequences of not complying with post-op food guidelines. Patient states she does understands the long term changes in food intake that will be necessary for all occasions after surgery for the rest of her life. Patient is deemed nutritionally appropriate to proceed. Goals  Weight: 160 lbs  Health Improvement: feel better overall, increased energy/stamina, improve knee pain    Assessment  Nutritional Needs: RMR=(9.99 x 113.7) + (6.25 x 167.6) - (4.92 x 42 y.o.) -161= 1816 kcal x 1.3 (sedentary activity factor)= 2361 kcal - 1000 (for 2 lb weight loss/week)= 1361 kcal.    Plan  Plan/Recommendations: Start presurgical guidelines. Goals:   -Eat 4-5 times daily  -Avoid high fat and high sugar foods  -Include protein with all meals and snacks  -Avoid carbonation and caffeine  -Avoid calorie containing beverages  -Increase physical activity as tolerated    PES Statement:  Overweight/Obesity related to lack of exercise, sedentary lifestyle, unhealthy eating habits, and unsuccessful diet attempts as evidenced by BMI. Body mass index is 40.45 kg/m². Will follow up as necessary.     Harshad Kim RD, LD

## 2023-01-18 ENCOUNTER — OFFICE VISIT (OUTPATIENT)
Dept: BARIATRICS/WEIGHT MGMT | Age: 43
End: 2023-01-18
Payer: COMMERCIAL

## 2023-01-18 DIAGNOSIS — E66.01 MORBID OBESITY WITH BMI OF 40.0-44.9, ADULT (HCC): Primary | ICD-10-CM

## 2023-01-18 LAB
ESTIMATED AVERAGE GLUCOSE: 102.5 MG/DL
HBA1C MFR BLD: 5.2 %

## 2023-01-18 PROCEDURE — 96156 HLTH BHV ASSMT/REASSESSMENT: CPT | Performed by: SOCIAL WORKER
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
## 2023-01-20 LAB
ALPHA-TOCOPHEROL: 11.1 MG/L (ref 5.5–18)
GAMMA-TOCOPHEROL: 0.8 MG/L (ref 0–6)
RETINYL PALMITATE: 0.02 MG/L (ref 0–0.1)
VITAMIN A LEVEL: 0.71 MG/L (ref 0.3–1.2)
VITAMIN A, INTERP: NORMAL

## 2023-02-14 ENCOUNTER — OFFICE VISIT (OUTPATIENT)
Dept: BARIATRICS/WEIGHT MGMT | Age: 43
End: 2023-02-14
Payer: COMMERCIAL

## 2023-02-14 VITALS
HEIGHT: 66 IN | HEART RATE: 76 BPM | WEIGHT: 237.6 LBS | BODY MASS INDEX: 38.18 KG/M2 | OXYGEN SATURATION: 98 % | RESPIRATION RATE: 18 BRPM | DIASTOLIC BLOOD PRESSURE: 70 MMHG | SYSTOLIC BLOOD PRESSURE: 130 MMHG

## 2023-02-14 DIAGNOSIS — E28.2 PCOS (POLYCYSTIC OVARIAN SYNDROME): ICD-10-CM

## 2023-02-14 DIAGNOSIS — E66.01 MORBID OBESITY WITH BMI OF 40.0-44.9, ADULT (HCC): Primary | ICD-10-CM

## 2023-02-14 DIAGNOSIS — E78.2 MIXED HYPERLIPIDEMIA: ICD-10-CM

## 2023-02-14 DIAGNOSIS — E66.01 SEVERE OBESITY (BMI 35.0-39.9) WITH COMORBIDITY (HCC): ICD-10-CM

## 2023-02-14 DIAGNOSIS — K21.9 CHRONIC GERD: ICD-10-CM

## 2023-02-14 DIAGNOSIS — I10 CHRONIC HYPERTENSION: ICD-10-CM

## 2023-02-14 PROCEDURE — 99214 OFFICE O/P EST MOD 30 MIN: CPT | Performed by: SURGERY

## 2023-02-14 PROCEDURE — 3075F SYST BP GE 130 - 139MM HG: CPT | Performed by: SURGERY

## 2023-02-14 PROCEDURE — 3078F DIAST BP <80 MM HG: CPT | Performed by: SURGERY

## 2023-02-14 RX ORDER — BIOTIN 10 MG
10 TABLET ORAL DAILY
COMMUNITY

## 2023-02-14 NOTE — PROGRESS NOTES
Mariann Dill lost 13 lbs over 1 month. Patient does have food allergies for pineapple, banana, avocado, hazelnut). Aspartame causes diarrhea. Works 8 AM- 5 PM, at home 2 days and in the office 3 days. She has focused on eating 5 times per day. She is using weight watchers. Removing trigger foods from house. Tracking her food intake     Pt recently met with behaviorist, struggles with binge/comfort eating. Is pt consuming smaller portions? smaller plates/bowls     Is pt consuming at least 64 oz of fluids per day? yes  Water and kaylan, herbal tea with stevia    Is pt consuming carbonated, caffeinated, or sugary beverages?   eliminated ETOH, soda, caffeine (switched to decaf with shameka nectar protein powder)    Has pt sampled Unjury and/or Nectar protein?  Tried 4 flavors - tolerates 3 flavors    Exercise: starting walking on treadmill at work last week twice in the last week for 30 min, yardwork this weekend    Support Group: attended 1/19 in person in [place]     Plan/Recommendations:   Continue with plan   Try additional    Handouts: none    Monae Neal, MS, RD, LD

## 2023-02-14 NOTE — PROGRESS NOTES
[address] [facility]   [facility]  Rakel Rachel MD, [address], [address], [address] [facility]    [place]  [address] . Phone: [phone]  Fax: [phone]        HPI:     Ronit West is a very pleasant 43 y.o. female with Body mass index is 38.35 kg/m². , Pre-Surgery for future weight loss. Pre-operative clearance and work up done. Working hard to keep good dietary habits as well level of activity. Patient denies any nausea, vomiting, fevers, chills, shortness of breath, chest pain, cough, constipation or difficulty urinating.     Pain Assessment   Denies any abdominal pain       Past Medical History:   Diagnosis Date    Depression     Diabetes mellitus ([address]     gestational- on metformin    Hypertension     chronic htn- on meds    Obesity (BMI 35.0-39.9 without comorbidity) 2017    PCOS (polycystic ovarian syndrome) 2017    Thyroid disease     hx of hypothyroid- on synthroid    Urinary incontinence      Past Surgical History:   Procedure Laterality Date     SECTION  2014    WISDOM TOOTH EXTRACTION       Family History   Problem Relation Age of Onset    Mental Illness Mother     Elevated Lipids Mother     Arthritis Mother     Cancer Mother     Depression Mother     High Blood Pressure Mother     Miscarriages / [place] Mother     Mental Illness Father     Arthritis Father     Hearing Loss Father     High Blood Pressure Father     Alcohol Abuse Father     Osteoporosis Father     Birth Defects Brother     Depression Brother     Mental Illness Brother     Diabetes Maternal Grandfather     Heart Disease Maternal Grandfather     Coronary Art Dis Maternal Grandfather     Heart Disease Paternal Grandmother     Stroke Paternal Grandmother     Vision Loss Paternal Grandmother     Mental Illness Paternal Grandmother     Alcohol Abuse Paternal Grandfather      Social History     Tobacco Use    Smoking status: Never    Smokeless tobacco: Never   Substance Use Topics    Alcohol use: Yes     I counseled the patient on the importance of not smoking and risks of ETOH. Allergies   Allergen Reactions    Latex Rash    Aspartame And Phenylalanine Diarrhea    Avocado Swelling    Banana      Stomach pain    Hazelnut (Fil[place]) Allergy Skin Test Swelling    Kiwi Extract     Pineapple Swelling    Vicodin [Hydrocodone-Acetaminophen] Nausea And Vomiting     Vitals:    02/14/23 0910   BP: 130/70   Pulse: 76   Resp: 18   SpO2: 98%   Weight: 237 lb 9.6 oz (107.8 kg)   Height: 5' 6\" (1.676 m)       Body mass index is 38.35 kg/m². Current Outpatient Medications:     Biotin 10 MG tablet, Take 10 mg by mouth daily, Disp: , Rfl:     Levonorgestrel (MIRENA, 52 MG, IU), by IntraUTERine route, Disp: , Rfl:       Review of Systems - History obtained from the patient  General ROS: negative  Psychological ROS: negative  Ophthalmic ROS: negative  Neurological ROS: negative  ENT ROS: negative  Allergy and Immunology ROS: negative  Hematological and Lymphatic ROS: negative  Endocrine ROS: negative  Breast ROS: negative  Respiratory ROS: negative  Cardiovascular ROS: negative  Gastrointestinal ROS:negative  Genito-Urinary ROS: negative  Musculoskeletal ROS: negative   Skin ROS: negative    Physical Exam   Vitals Reviewed   Constitutional: Patient is oriented to person, place, and time. Patient appears well-developed and well-nourished. Patient is active and cooperative. Non-toxic appearance. No distress. HENT:   Head: Normocephalic and atraumatic. Head is without abrasion and without laceration. Hair is normal.   Right Ear: External ear normal. No lacerations. No drainage, swelling . Left Ear: External ear normal. No lacerations. No drainage, swelling. Mouth/Nose: Mask in Place   Eyes: Conjunctivae, EOM and lids are normal. Right eye exhibits no discharge. No foreign body present in the right eye. Left eye exhibits no discharge. No foreign body present in the left eye. No scleral icterus. Neck: Trachea normal and normal range of motion. No JVD present. Pulmonary/Chest: Effort normal. No accessory muscle usage or stridor. No apnea. No respiratory distress. Cardiovascular: Normal rate and no JVD. Abdominal: Normal appearance. Patient exhibits no distension. Abdomen is soft, obese, non tender. Musculoskeletal: Normal range of motion. Patient exhibits no edema. Neurological: Patient is alert and oriented to person, place, and time. Patient has normal strength. GCS eye subscore is 4. GCS verbal subscore is 5. GCS motor subscore is 6. Skin: Skin is warm and dry. No abrasion and no rash noted. Patient is not diaphoretic. No cyanosis or erythema. Psychiatric: Patient has a normal mood and affect. Speech is normal and behavior is normal. Cognition and memory are normal.       A/P       Roselie Medora is 43 y.o. female, Body mass index is 38.35 kg/m². pre surgery. The patient underwent dietary counseling with registered dietician. I have reviewed, discussed and agree with the dietary plan. Patient is trying hard to keep good dietary and behavior modifications. Patient is monitoring portion sizes, food choices and liquid calories. Patient is trying to exercise regularly as much as possible. We discussed how her weight affects her overall health including:  Patient Active Problem List   Diagnosis    PCOS (polycystic ovarian syndrome)    Anxiety    Post traumatic stress disorder (PTSD)    Obesity (BMI 30-39. 9)    Snoring    Mixed hyperlipidemia    Hypertension affecting pregnancy, antepartum    H/O  section    Chronic hypertension    Preoperative clearance    Morbid obesity with BMI of 40.0-44.9, adult (HCC)    Chronic GERD    and importance of weight loss to alleviate those co morbid conditions. I encouraged the patient to continue exercise and keeping healthy eating habits. Discussed pre-op labs and work up till now. Also counseled the patient extensively on Surgery. The encounter today, included any number of the following: Bariatric Pre/Post operative work up/protocols, review of labs, imaging, provider notes, outside hospital records, performing examination/evaluation, counseling patient and/or family, ordering medications/tests, placing referrals and communication with referring physicians, coordination of care; discussing dietary plan/recall with the patient as well with registered dietitian and documentation in the EHR. Of note, the above was done during same day of the actual patient encounter. I did explain thoroughly to the patient that compliance with pre- and post op diet and other recommendations are integral part to improve the chances of successful weight loss and also not following it could end with serious health complications. Some strategies discussed today include but not limited to : 30/30/30 minutes rule, food diary, avoid fast food and packing/planing ahead, & increasing exercise. Also stressed to the patient importance of taking the multivitamins as instructed, otherwise risk significant complications. Kala Linn is a 43 y.o. female with Body mass index is 38.35 kg/m². ,  patient is deemed appropriate candidate for weight loss surgery by our multidisciplinary team.       Following The Metabolic and Bariatric Surgery Accreditation and Quality Improvement Program [facility]), and [employer] (ACS) recommendations, the Bariatric Surgical Risk/Benefit Calculator was used for Kala Linn. Report was discussed with Julia Velasquez and patient wishes to proceed with surgery, fully understanding the risks and benefits. Of note, [facility] Bariatric Surgical Risk/Benefit Calculator estimates the chance of an unfavorable outcome (such as a complication or death), the chance of remission of weight-related comorbidities, and the patient's BMI, weight change, and percent total weight change after surgery.  These quantities are estimated based upon information the patient gives the healthcare provider about prior health history. The estimates are calculated using data from a large number of patients who had a primary bariatric surgical procedure similar to the one the patient may have. Please note the risk percentages, remission percentages, BMI, weight change, and percent total weight change provided to you by the risk/benefit calculator are only estimates. These estimates only take certain information into account. There may be other factors that are not included in the estimate which may increase or decrease the risk of a complication, the chance of remission of a weight-related comorbidity, or the amount of weight the patient loses. These estimates are not a guarantee of results. A complication after surgery may happen even if the risk is low, a weight-related comorbidity may not go into remission even if the chances are high, and a patient may lose more or less weight than predicted. This information is not intended to replace the advice of a doctor or healthcare provider about the diagnosis, treatment, or potential outcomes. The Provider is not responsible for medical decisions that may be made by the patient based on the risk/benefit calculator estimates, since these estimates are provided for informational purposes. Patients should always consult their doctor or other health care provider before deciding on a treatment plan. Both open and laparoscopic approach were explained in details. Benefits and risks explained. Informed consent obtained. Risks including but not limited to; Infection, bleeding, gastric leak or obstruction, persistent nausea, vomiting, or reflux, injury to surrounding structures, risks of anesthesia, stricture, delayed gastric emptying, staple line leak, incisional hernia, malnutrition, vitamin deficiency, neurological complications, paralysis,  hair loss, and/ or Conversion to Open surgery may be necessary. Failure to lose or maintain weight loss, Gallstones or Kidney Stones, Deep Venous Thrombosis , pulmonary embolism and / or death. With obesity and/or Fatty liver , I may elect to perform liver core biopsy to have  Baseline idea on liver pathology if there is abnormal Liver Functions or if there is hepatomegaly &/or lesion, risks include but not limited to bile leak, liver hematoma or failure to diagnose a condition. I did explain thoroughly to the patient that compliance with pre- and post op diet and other recommendations are integral part to improve the chances of successful weight loss and also not following it could end with serious health complications. We discussed that our goal is to ameliorate the medical problems and not to obtain a specific body mass index. Patient understands the risks and benefits and wishes to proceed with the procedure. Also understands if BMI is lower than 40 without significant co morbid conditions, concerns for risks of surgery being somewhat higher over long run, however patient wants to proceed and fully understands the risks. Clearly BMI over 35 does impose very serious health risks as well chances of losing weight on diet only is very limited and sustaining weight loss is even less, thus surgery is certainly recommended for long term weight loss and better health overall given compliance. Obesity as a disease is considered a high risk to patients overall health and should therefore be considered a high risk disease state. Now with Covid-19 pandemic, CDC and health authorities does classify obese patients as vulnerable and high risk as well. Which makes weight loss a priority for improvement of their wellbeing and overall health. I advised the patient that we can't guarantee final insurance approval.        I advised the patient that sometimes other indicated procedures may arise and the decision will be based on my assessment intraoperatively. Some may include include but not limited to:  [Ventral Hernia, Risks include but not limited to; infection, bleeding, injury to organs or nerves or vessels, PE, DVT, cardiac events, , persistent pain or symptoms, abscess, hernia recurrence or need for further procedures. However that will be determined intra-operatively, if not safe to proceed , then any additional procedures will have to be addressed later as primary goal is bariatric surgery.]      [ Hiatal Hernia, will attempt repair,  risks and benefits including but not limited to; hemothorax, pneumothorax, recurrence, difficulty swallowing, persistent symptoms, reflux and need for medications, esophageal, splenic, lung, heart, bowel, vagus nerve or gastric injuries. However that will be determined intra-operatively, if not safe to proceed, then any additional procedures will have to be addressed later as primary goal is bariatric surgery.]     Eugenio Pena understands that there may be a need to perform other urgent or necessary procedures that were unanticipated. Angeles John consent to the performance of any additional procedures determined during the original procedure to be in their best interests and where delay might cause additional harm or put patient at risk for additional anesthesia risk for required by a second procedure and that will be determined by the surgeon. Angeles John is aware if Weight gain occurs in pre-op period while on pre-operative diet or  non compliant with it , surgery will be canceled. Eugenio Pena understand that in relation to the COVID-19 pandemic and surgical procedures, patient have been given the opportunity to postpone   surgery until a  later date. Angeles John have chosen to proceed with surgery knowing the risks associated with COVID-19. Eugenio Pena was satisfied with the discussion we had and Eugenio Pena had no further questions at end of visit and wants to proceed with surgery.      1- Pre-operative work up ordered for you(labs/x-rays/EKG). 2- Stop taking Blood thinners,one week before surgery. 3- F/U with PCP for pre-op Medical Clearance. 4- Plan for Laparoscopic Sleeve, possible other indicated procedures. Severe Obesity: Body mass index is 38.35 kg/m². [x] Continue to make dietary and lifestyle modifications. [x] Plan for Future laparoscopic sleeve gastrectomy. [x] Return for follow-up next month. Chronic GERD:   [x] Continue to make dietary and lifestyle modifications. [] Continue Omeprazole. [] Continue Famotidine. [x] Weight loss Recommended. Essential Hypertension:  [x] Continue to make dietary and lifestyle modifications. [x] Reviewed the importance of checking blood pressure. [x] Continue to follow up with their PCP for medication management and monitoring. [x] Weight loss Recommended. Hyperlipidemia:   [x] Continue to make dietary and lifestyle modifications. [x] Continue to follow up with their PCP for medication management and monitoring. [x] Weight loss Recommended. Metabolic Syndrome:   [x] Continue to make dietary and lifestyle modifications. [x] Continue to follow up with their PCP for Medical Management. [x] Weight loss Recommended. Please note that some or all of this report was generated using voice recognition software. Please notify me in case of any questions about the content of this document, as some errors in transcription may have occurred .

## 2023-02-15 ASSESSMENT — ENCOUNTER SYMPTOMS
GASTROINTESTINAL NEGATIVE: 1
EYES NEGATIVE: 1
SHORTNESS OF BREATH: 0
ALLERGIC/IMMUNOLOGIC NEGATIVE: 1
RESPIRATORY NEGATIVE: 1
COUGH: 0

## 2023-02-15 NOTE — PROGRESS NOTES
Patient Name:   Kaylee Ingram      Type of Surgery:  Sleeve         Date of Surgery: Wednesday March 22nd     Start Pre-Op Diet On:  Thursday March 9th      Start Clear Liquids On:  Tuesday March 21st        NPO after midnight the night before your surgery! Take morning medications with a small amount of water.     NOTES:_______________________________________  ______________________________________________

## 2023-02-15 NOTE — PROGRESS NOTES
[facility]) Physicians   [employer]    Subjective:      Patient ID: Elver Jimenez is a 43 y.o. female    HPI    The patient is here for their bariatric surgery preoperative education group visit. She has made several attempts at weight loss in the past without success and now has been scheduled to have bariatric surgery on 2023 for future weight loss. She is working to change her dietary behaviors and lose weight to improve comorbid conditions such as hypertension, hyperlipidemia and GERD. Elver Jimenez is a very pleasant 43 y.o. female with Body mass index is 37.96 kg/m².     Past Medical History:   Diagnosis Date    Depression     Diabetes mellitus ([facility] [address]     gestational- on metformin    Hypertension     chronic htn- on meds    Obesity (BMI 35.0-39.9 without comorbidity) 2017    PCOS (polycystic ovarian syndrome) 2017    Thyroid disease     hx of hypothyroid- on synthroid    Urinary incontinence      Past Surgical History:   Procedure Laterality Date     SECTION      WISDOM TOOTH EXTRACTION       Family History   Problem Relation Age of Onset    Mental Illness Mother     Elevated Lipids Mother     Arthritis Mother     Cancer Mother     Depression Mother     High Blood Pressure Mother     Miscarriages / [place] Mother     Mental Illness Father     Arthritis Father     Hearing Loss Father     High Blood Pressure Father     Alcohol Abuse Father     Osteoporosis Father     Birth Defects Brother     Depression Brother     Mental Illness Brother     Diabetes Maternal Grandfather     Heart Disease Maternal Grandfather     Coronary Art Dis Maternal Grandfather     Heart Disease Paternal Grandmother     Stroke Paternal Grandmother     Vision Loss Paternal Grandmother     Mental Illness Paternal Grandmother     Alcohol Abuse Paternal Grandfather      Social History     Tobacco Use    Smoking status: Never    Smokeless tobacco: Never   Substance Use Topics Alcohol use: Not Currently     I counseled the patient on the importance of not smoking and risks of ETOH. Allergies   Allergen Reactions    Latex Rash    Aspartame And Phenylalanine Diarrhea    Avocado Swelling    Banana      Stomach pain    Hazelnut (Filbert) Allergy Skin Test Swelling    Kiwi Extract     Pineapple Swelling    Vicodin [Hydrocodone-Acetaminophen] Nausea And Vomiting     Vitals:    02/21/23 1305   Weight: 235 lb 3.2 oz (106.7 kg)   Height: 5' 6\" (1.676 m)     Body mass index is 37.96 kg/m².     Current Outpatient Medications:     Biotin 10 MG tablet, Take 10 mg by mouth daily, Disp: , Rfl:     Levonorgestrel (MIRENA, 52 MG, IU), by IntraUTERine route, Disp: , Rfl:     Lab Results   Component Value Date/Time    WBC 4.5 01/17/2023 11:30 AM    RBC 5.04 01/17/2023 11:30 AM    HGB 14.3 01/17/2023 11:30 AM    HCT 44.4 01/17/2023 11:30 AM    MCV 88.0 01/17/2023 11:30 AM    MCH 28.4 01/17/2023 11:30 AM    MCHC 32.3 01/17/2023 11:30 AM    MPV 8.8 01/17/2023 11:30 AM    NEUTOPHILPCT 58.2 01/17/2023 11:30 AM    LYMPHOPCT 30.1 01/17/2023 11:30 AM    MONOPCT 9.3 01/17/2023 11:30 AM    EOSRELPCT 1.8 01/17/2023 11:30 AM    BASOPCT 0.6 01/17/2023 11:30 AM    NEUTROABS 2.6 01/17/2023 11:30 AM    LYMPHSABS 1.3 01/17/2023 11:30 AM    MONOSABS 0.4 01/17/2023 11:30 AM    EOSABS 0.1 01/17/2023 11:30 AM     Lab Results   Component Value Date/Time     01/17/2023 11:30 AM    K 4.4 01/17/2023 11:30 AM     01/17/2023 11:30 AM    CO2 23 01/17/2023 11:30 AM    ANIONGAP 15 01/17/2023 11:30 AM    GLUCOSE 87 01/17/2023 11:30 AM    BUN 15 01/17/2023 11:30 AM    CREATININE 0.8 01/17/2023 11:30 AM    LABGLOM >60 01/17/2023 11:30 AM    GFRAA >60 08/02/2018 05:10 AM    CALCIUM 9.2 01/17/2023 11:30 AM    PROT 7.4 01/17/2023 11:30 AM    LABALBU 4.4 01/17/2023 11:30 AM    AGRATIO 1.5 01/17/2023 11:30 AM    BILITOT 0.4 01/17/2023 11:30 AM    ALKPHOS 68 01/17/2023 11:30 AM    ALT 20 01/17/2023 11:30 AM    AST 19 01/17/2023 11:30 AM    GLOB 2.5 08/02/2018 05:10 AM     Lab Results   Component Value Date/Time    CHOL 196 01/17/2023 11:30 AM    TRIG 178 01/17/2023 11:30 AM    HDL 35 01/17/2023 11:30 AM    LDLCALC 125 01/17/2023 11:30 AM    LABVLDL 36 01/17/2023 11:30 AM     Lab Results   Component Value Date/Time    TSHREFLEX 1.85 01/17/2023 11:30 AM     Lab Results   Component Value Date/Time    IRON 107 01/17/2023 11:30 AM    TIBC 291 01/17/2023 11:30 AM    LABIRON 37 01/17/2023 11:30 AM     Lab Results   Component Value Date/Time    XBVOAYNS52 508 01/17/2023 11:30 AM    FOLATE 17.27 01/17/2023 11:30 AM     Lab Results   Component Value Date/Time    VITD25 34.4 01/17/2023 11:30 AM     Lab Results   Component Value Date/Time    LABA1C 5.2 01/17/2023 11:30 AM    .5 01/17/2023 11:30 AM       Review of Systems   Constitutional: Negative. Negative for chills, fatigue and fever. HENT: Negative. Eyes: Negative. Respiratory: Negative. Negative for cough and shortness of breath. Cardiovascular: Negative. Gastrointestinal: Negative. Endocrine: Negative. Genitourinary: Negative. Musculoskeletal: Negative. Skin: Negative. Allergic/Immunologic: Negative. Neurological: Negative. Hematological: Negative. Psychiatric/Behavioral: Negative. Objective:     Physical Exam  Vitals reviewed. Constitutional:       Appearance: Normal appearance. She is well-developed. She is obese. HENT:      Head: Normocephalic and atraumatic. Eyes:      Conjunctiva/sclera: Conjunctivae normal.      Pupils: Pupils are equal, round, and reactive to light. Pulmonary:      Effort: Pulmonary effort is normal.   Abdominal:      Palpations: Abdomen is soft. Musculoskeletal:         General: Normal range of motion. Cervical back: Normal range of motion and neck supple. Skin:     General: Skin is warm and dry. Neurological:      Mental Status: She is alert and oriented to person, place, and time.    Psychiatric: Mood and Affect: Mood normal.         Behavior: Behavior normal.         Thought Content: Thought content normal.         Judgment: Judgment normal.     Assessment and Plan:   Patient is here for their preoperative education group visit for sleeve gastrectomy. The patient is down 2.4 lbs today. The patient's current Body mass index is 37.96 kg/m². (2/21/23). She is making good dietary and behavior modifications and is considered to be a good surgical candidate. Patient has a diagnosis of hypertension. Reviewed the importance of checking blood pressure preoperatively and postoperatively. In addition, following up with their PCP for medication adjustments as needed. Discussed the fact that they will be required to crush or open their medications for the first two weeks after surgery and reviewed those medications that can not be crushed. Patient has a diagnosis of hyperlipidemia. Discussed the benefits of weight loss and dietary changes on lipids and cholesterol. Discussed the fact that they will be required to crush or open their medications for the first two weeks after surgery and reviewed those medications that can not be crushed. Patient has a diagnosis of chronic GERD. Discussed the benefits of weight loss and dietary changes on acid reflux. If they are currently not taking a PPI/H2 Blocker then we will start one for a short term basis as they work through the phases of the postoperative diet. Discussed the fact that they will be required to crush or open their medications for the first two weeks after surgery and reviewed those medications that can not be crushed. Patient received instruction that it is recommended to avoid pregnancy following bariatric surgery for at least 2 years to allow them to have stable weight loss and to help avoid increased risk of vitamin deficiencies and malnutrition.  The patient was encouraged to discuss possible contraceptive methods with their PCP or OBGYN. Patient received instructions from the registered dietitian in reference to the two week preoperative diet and the four phases of their postoperative diet. In addition I reviewed these instructions and stressed the importance of following these recommendations for their safety. Patient completed the preoperative class where they were provided with education related to their bariatric surgery, common surgical complications, medications preoperatively & postoperatively, special concerns related to bariatric surgery postoperatively, vitamin supplementation, patient agreement, PAT & scheduling, hospital course, wellness discovery program and what to do in the case of an emergency postoperatively. The dietitians reviewed all preoperative and postoperative diet instructions. Patient was given the opportunity to ask questions during the group visit and these questions were answered by myself and/or the dietitian. I spent a total of 45 minutes on the day of the visit and over half of that time was spent counseling the patient on proper dietary behaviors, exercise and surgery protocols.

## 2023-02-16 PROBLEM — Z01.818 PREOPERATIVE CLEARANCE: Status: RESOLVED | Noted: 2023-01-17 | Resolved: 2023-02-16

## 2023-02-21 ENCOUNTER — OFFICE VISIT (OUTPATIENT)
Dept: BARIATRICS/WEIGHT MGMT | Age: 43
End: 2023-02-21
Payer: COMMERCIAL

## 2023-02-21 VITALS — HEIGHT: 66 IN | WEIGHT: 235.2 LBS | BODY MASS INDEX: 37.8 KG/M2

## 2023-02-21 DIAGNOSIS — I10 CHRONIC HYPERTENSION: ICD-10-CM

## 2023-02-21 DIAGNOSIS — K21.9 CHRONIC GERD: Primary | ICD-10-CM

## 2023-02-21 DIAGNOSIS — E78.2 MIXED HYPERLIPIDEMIA: ICD-10-CM

## 2023-02-21 DIAGNOSIS — E66.01 SEVERE OBESITY (BMI 35.0-39.9) WITH COMORBIDITY (HCC): ICD-10-CM

## 2023-02-21 PROCEDURE — 99214 OFFICE O/P EST MOD 30 MIN: CPT | Performed by: NURSE PRACTITIONER

## 2023-02-22 NOTE — PROGRESS NOTES
Patient reached __x__ yes  _____ no   VM instructions left ____ yes   phone number ________                                ____ no-office notified          Date _3/3/2023________  Time _0730______  Arrival __0530____    Nothing to eat or drink after midnight-follow your doctors prep instructions-this may include taking a second dose of your prep after midnight  Responsible adult 25 or older to stay on site while you are here-drive you home-stay with you after  Follow any instructions your doctors office has given you  Bring a complete list of all your medications and supplements including name,dose,how often taken the day of your procedure  If you normally take the following medications in the morning please do so the AM of your procedure with a small sip of water       Heart,blood pressure,seizure,thyroid or breathing medications-use your inhalers-bring any rescue inhalers with you DOS       DO NOT take blood pressure medications ending in \"favian\" or \"pril\" the AM of procedure or evening prior  Dr Shannon Spencer patients are not to take any medications the AM of surgery  Take half or your normal dose of any long acting insulins the night before your procedure-do not take any diabetic medications the AM of procedure  Follow your doctors instructions regarding stopping or taking  any blood thinners-if you do not have instructions-call them  Any questions call your doctor  Other ______________________________________________________________                [employer] [employer] POLICY(subject to change)             The current policy is 2 visitors per patient. There are no children allowed. Mask at discretion of facility. Visiting hours are 8a-8p. Overnight visitors will be at the discretion of the nurse. All policies are subject to change.

## 2023-02-27 ENCOUNTER — TELEPHONE (OUTPATIENT)
Dept: BARIATRICS/WEIGHT MGMT | Age: 43
End: 2023-02-27

## 2023-02-27 NOTE — TELEPHONE ENCOUNTER
EMERGENCY DEPARTMENT HISTORY AND PHYSICAL EXAM     ------------------------------------------------------------------------------------------------------  Please note that this dictation was completed with [employer], the [employer] voice recognition software. Quite often unanticipated grammatical, syntax, homophones, and other interpretive errors are inadvertently transcribed by the computer software. Please disregard these errors. Please excuse any errors that have escaped final proofreading.  -----------------------------------------------------------------------------------------------------------------    Date: 7/25/2022  Patient Name: Nay Carranza    History of Presenting Illness     Chief Complaint   Patient presents with    Transitions Of Care     Transfer from [facility] for Evaluation by Surgery for Abdominal Pain and CT Findings       History Provided By: Patient, EMS     HPI: Nay Carranza is a 68 y.o. female, with significant pmhx of 2, hypertension, anxiety, cholesterol, GERD, diabetes, CAD, recent lithotripsy for ureteral stone, who presents via EMS  From the [facility] ED to our ED with report of hypokalemia, nausea, vomiting after recent lithotripsy with our hospital.  Patient received run of potassium, antiemetics without significant findings on CT. Sent to our hospital for continuity of care with intractable nausea and vomiting and unable to tolerate p.o. at home. HPI per OSH:  68-year-old female with past medical history of hypertension, dementia, GERD, brought into the emergency room with chief complaint of abdominal pain. Limited history from patient. She complains of nausea, and pain bilateral lower ribs, and abdomen. Additional history provided by patient's daughter. She states that patient was just discharged from [facility], after admission for nephrolithiasis. She states the patient was discharged yesterday.  She states that today patient's been Spoke with pt regarding egd on 03/03/2023. Told pt about clear liquid diet/NPO after midnight/arrival time. Pt verbalized understanding. complaining of increasing pain, had episodes of vomiting, not tolerating p.o. intake. She denies any coughing, shortness of breath, or chest pain. She states that her mom's mental status of some confusion, is baseline, denies any acute changes.         Past History     Past Medical History:  Past Medical History:   Diagnosis Date    Agoraphobia without mention of panic attacks 2/17/2014    Anxiety disorder 8/18/2013    Arthritis     osteo    CAD (coronary artery disease), native coronary artery 12/1/2015    no stents    Chronic chest pain 1/13/2014    Chronic pain associated with significant psychosocial dysfunction 2/17/2014    Dementia ([facility] [address]     Depression 8/18/2013    Diabetes ([facility] [address]     type II    Duplicated right renal collecting system 3/13/2014    GERD (gastroesophageal reflux disease)     Gout, joint     Hypercholesteremia     hyercholesterolemia    Hypertension     Murmur     Nephrolithiasis 3/13/2014    Personal history of noncompliance with medical treatment, presenting hazards to health 5/30/2014       Past Surgical History:  Past Surgical History:   Procedure Laterality Date    EGD  4/23/2010         HX CHOLECYSTECTOMY  09/20/2018    lap jesus    HX CYST REMOVAL      cyst removed from left wrist    HX HEART CATHETERIZATION  12/01/2015    HX HYSTERECTOMY      partial    HX OTHER SURGICAL      bladder dilitation    HX TUBAL LIGATION      HX UROLOGICAL      kidney stones    UPPER GI ENDOSCOPY,BALL DIL,30MM  5/31/2022            Family History:  Family History   Problem Relation Age of Onset    Stroke Mother     Heart Disease Mother     Cancer Father         type unknown    Heart Disease Son     Liver Disease Son     Heart Disease Daughter     Malignant Hyperthermia Neg Hx     Pseudocholinesterase Deficiency Neg Hx     Delayed Awakening Neg Hx     Post-op Nausea/Vomiting Neg Hx     Emergence Delirium Neg Hx     Post-op Cognitive Dysfunction Neg Hx     Other Neg Hx        Social History:  Social History Tobacco Use    Smoking status: Never    Smokeless tobacco: Never   Vaping Use    Vaping Use: Never used   Substance Use Topics    Alcohol use: No    Drug use: No       Allergies: Allergies   Allergen Reactions    Amoxicillin Hives    Sulfa (Sulfonamide Antibiotics) Hives and Itching    Mirtazapine Itching and Nausea Only     Funny feeling in chest    Percocet [Oxycodone-Acetaminophen] Nausea and Vomiting    Codeine Nausea and Vomiting    Crestor [Rosuvastatin] Other (comments)     myalgias    Nitroglycerin Unknown (comments)     Patient cannot remember why she is allergic to it      Prednisone Itching    Zithromax [Azithromycin] Itching     Not sure what it does,taken long time ago         Review of Systems   Review of Systems   Constitutional: Negative. Negative for fever. Eyes: Negative. Respiratory: Negative. Negative for shortness of breath. Cardiovascular:  Negative for chest pain. Gastrointestinal:  Positive for nausea and vomiting. Negative for abdominal pain. Endocrine: Negative. Genitourinary: Negative. Negative for difficulty urinating, dysuria and hematuria. Musculoskeletal: Negative. Skin: Negative. Neurological:  Positive for headaches. Psychiatric/Behavioral:  Negative for suicidal ideas. All other systems reviewed and are negative. Physical Exam   Physical Exam  Vitals and nursing note reviewed. Constitutional:       Comments: Frail   HENT:      Head: Normocephalic and atraumatic. Nose: No nasal deformity. Mouth/Throat:      Lips: No lesions. Eyes:      Conjunctiva/sclera: Conjunctivae normal.   Cardiovascular:      Rate and Rhythm: Normal rate. Pulmonary:      Effort: Pulmonary effort is normal.   Musculoskeletal:         General: Normal range of motion. Cervical back: Normal range of motion. No pain with movement. Right lower leg: No edema. Left lower leg: No edema. Skin:     General: Skin is dry. Findings: No rash. Neurological:      General: No focal deficit present. Mental Status: She is alert. Psychiatric:         Mood and Affect: Mood normal.         Diagnostic Study Results     Labs -     Recent Results (from the past 12 hour(s))   POC CHEM8    Collection Time: 07/25/22  6:51 AM   Result Value Ref Range    Calcium, ionized (POC) 1.09 (L) 1.12 - 1.32 mmol/L    Sodium (POC) 141 136 - 145 mmol/L    Potassium (POC) 3.0 (L) 3.5 - 5.1 mmol/L    Chloride (POC) 103 98 - 107 mmol/L    Glucose (POC) 74 65 - 100 mg/dL    Creatinine (POC) 0.74 0.6 - 1.3 mg/dL    GFRAA, POC >60 >60 ml/min/1.73m2    GFRNA, POC >60 >60 ml/min/1.73m2    Comment Comment Not Indicated. Critical value read back 20        Radiologic Studies -   No orders to display     CT Results  (Last 48 hours)      None          CXR Results  (Last 48 hours)                 07/23/22 1934  XR CHEST PORT Final result    Impression:  No evidence of acute cardiopulmonary process. No significant change   from the prior study. Narrative:  INDICATION: Shortness of breath       COMPARISON: 6/26/2022       FINDINGS: AP portable imaging of the chest performed at 8:08 PM demonstrates a   stable cardiomediastinal silhouette. Right upper lobe is obscured by head   positioning. There is unchanged elevation of the right hemidiaphragm. No new   airspace disease or pleural effusion is evident. Gaseous colonic distention is   similar to the prior study. There are degenerative changes in the thoracic   spine. Medical Decision Making   I am the first provider for this patient. I reviewed the vital signs, available nursing notes, past medical history, past surgical history, family history and social history. Vital Signs-Reviewed the patient's vital signs.   Patient Vitals for the past 12 hrs:   Temp Pulse Resp BP SpO2   07/25/22 0507 98.2 °F (36.8 °C) 67 14 (!) 161/80 99 %         Provider Notes (Medical Decision Making): Impression:  hypoKalemia, intractable nausea and vomiting      ED Course as of 07/26/22 0456   Mon Jul 25, 2022   [id number]   Admission Note:  Patient is being admitted to the hospital by Dr. Pablo Morrison, Service: Hospitalist.  The results of their tests and reasons for their admission have been discussed with them and available family. They convey agreement and understanding for the need to be admitted and for their admission diagnosis. [AK]      ED Course User Index  [AK] Violetta Rodgers MD         Critical Care Time:     none      Diagnosis     Clinical Impression:   1. Hypokalemia    2. Nausea and vomiting, unspecified vomiting type        PLAN:  1.  Admit to hospitalist

## 2023-02-28 RX ORDER — M-VIT,TX,IRON,MINS/CALC/FOLIC 27MG-0.4MG
1 TABLET ORAL DAILY
COMMUNITY

## 2023-02-28 RX ORDER — SCOLOPAMINE TRANSDERMAL SYSTEM 1 MG/1
1 PATCH, EXTENDED RELEASE TRANSDERMAL ONCE
OUTPATIENT
Start: 2023-02-28 | End: 2023-02-28

## 2023-02-28 RX ORDER — ACETAMINOPHEN 160 MG/5ML
650 SOLUTION ORAL ONCE
OUTPATIENT
Start: 2023-02-28 | End: 2023-02-28

## 2023-02-28 RX ORDER — SODIUM CHLORIDE, SODIUM LACTATE, POTASSIUM CHLORIDE, CALCIUM CHLORIDE 600; 310; 30; 20 MG/100ML; MG/100ML; MG/100ML; MG/100ML
INJECTION, SOLUTION INTRAVENOUS CONTINUOUS
OUTPATIENT
Start: 2023-02-28

## 2023-02-28 RX ORDER — BIOTIN 2500 MCG
CAPSULE ORAL
COMMUNITY

## 2023-02-28 RX ORDER — ENOXAPARIN SODIUM 100 MG/ML
30 INJECTION SUBCUTANEOUS ONCE
OUTPATIENT
Start: 2023-02-28 | End: 2023-02-28

## 2023-02-28 RX ORDER — APREPITANT 80 MG/1
80 CAPSULE ORAL ONCE
OUTPATIENT
Start: 2023-02-28 | End: 2023-02-28

## 2023-02-28 RX ORDER — LIDOCAINE HYDROCHLORIDE 10 MG/ML
0.5 INJECTION, SOLUTION EPIDURAL; INFILTRATION; INTRACAUDAL; PERINEURAL ONCE
OUTPATIENT
Start: 2023-02-28 | End: 2023-02-28

## 2023-02-28 NOTE — PROGRESS NOTES
Name_______________________________________Printed:____________________  Date and time of surgery__3/22/23  0730______________________Arrival Time:___0600  main_____________   1. The instructions given regarding when and if a patient needs to stop oral intake prior to surgery varies. Follow the specific instructions you were given                 XXXXX ___Nothing to eat or to drink after Midnight the night before.                   ____Carbo loading or instructions will be given to select patients-if you have been given those instructions -please do the following                           The evening before your surgery after dinner before midnight drink 40 ounces of gatorade. If you are diabetic use sugar free. The morning of surgery drink 40 ounces of water. This needs to be finished 3 hours prior to your surgery start time. 2. Take the following pills with a small sip of water on the morning of surgery_____none______________________________________________                  Do not take blood pressure medications ending in pril or sartan the brian prior to surgery or the morning of surgery. Dr Ella Starkey patient are not to take any medications the AM of surgery. 3. Aspirin, Ibuprofen, Advil, Naproxen, Vitamin E and other Anti-inflammatory products and supplements should be stopped for 5 -7days before surgery or as directed by your physician. 4. Check with your Doctor regarding stopping Plavix, Coumadin,Eliquis, Lovenox,Effient,Pradaxa,Xarelto, Fragmin or other blood thinners and follow their instructions. 5. Do not smoke, and do not drink any alcoholic beverages 24 hours prior to surgery. This includes NA Beer. Refrain from the usage of any recreational drugs. 6. You may brush your teeth and gargle the morning of surgery. DO NOT SWALLOW WATER   7. You MUST make arrangements for a responsible adult to stay on site while you are here and take you home after your surgery.  You will not be allowed to leave alone or drive yourself home. It is strongly suggested someone stay with you the first 24 hrs. Your surgery will be cancelled if you do not have a ride home. 8. A parent/legal guardian must accompany a child scheduled for surgery and plan to stay at the hospital until the child is discharged. Please do not bring other children with you. 9. Please wear simple, loose fitting clothing to the hospital.  [employer] not bring valuables (money, credit cards, checkbooks, etc.) Do not wear any makeup (including no eye makeup) or nail polish on your fingers or toes. 10. DO NOT wear any jewelry or piercings on day of surgery. All body piercing jewelry must be removed. 11. If you have ___dentures, they will be removed before going to the OR; we will provide you a container. If you wear ___contact lenses or ___glasses, they will be removed; please bring a case for them. 12. Please see your family doctor/pediatrician for a history & physical and/or concerning medications. Bring any test results/reports from your physician's office. PCP__________________Phone___________H&P Appt. Date________             13 If you  have a Living Will and Durable Power of  for Healthcare, please bring in a copy. 15. Notify your Surgeon if you develop any illness between now and surgery  time, cough, cold, fever, sore throat, nausea, vomiting, etc.  Please notify your surgeon if you experience dizziness, shortness of breath or blurred vision between now & the time of your surgery             15. DO NOT shave your operative site 96 hours prior to surgery. For face & neck surgery, men may use an electric razor 48 hours prior to surgery. 16. Shower the night before or morning of surgery using an antibacterial soap or as you have been instructed. 17. To provide excellent care visitors will be limited to one in the room at any given time.              18.  Please bring picture ID and insurance card. 19.  Visit our web site for additional information:  [email or web address]              20.During flu season no children under the age of 15 are permitted in the hospital for the safety of all patients. 21. If you take a long acting insulin in the evening only  take half of your usual  dose the night  before your procedure              22. If you use a c-pap please bring DOS if staying overnight,             23.For your convenience [facility] has a pharmacy on site to fill your prescriptions. 24. If you use oxygen and have a portable tank please bring it  with you the DOS             25. Bring a complete list of all your medications with name and dose include any supplements. 26. Other__________________________________________   *Please call pre admission testing if you any further questions   Mirtha Ng   [address]    [place] [address]. [facility]  [phone]   [address] [place] [address]       VISITOR POLICY(subject to change)    Current policy is 2 visitors per patient. No children. Mask is  at the discretion of the facility. Visiting hours are 8a-8p. Overnight visitors will be at the discretion of the nurse. All policies subject to change. All above information reviewed with patient in person or by phone. Patient verbalizes understanding. All questions and concerns addressed. Patient/Rep____________________                                                                                                                                  Follow dietary restrictions per Dr. Denisha Villalobos office.

## 2023-03-02 ENCOUNTER — TELEPHONE (OUTPATIENT)
Dept: BARIATRICS/WEIGHT MGMT | Age: 43
End: 2023-03-02

## 2023-03-02 ENCOUNTER — HOSPITAL ENCOUNTER (OUTPATIENT)
Age: 43
Discharge: HOME | End: 2023-03-02
Payer: COMMERCIAL

## 2023-03-02 DIAGNOSIS — Z01.818 PREOP TESTING: ICD-10-CM

## 2023-03-02 LAB
A/G RATIO: 1.4 (ref 1.1–2.2)
ABO/RH: NORMAL
ALBUMIN SERPL-MCNC: 4.2 G/DL (ref 3.4–5)
ALP BLD-CCNC: 67 U/L (ref 40–129)
ALT SERPL-CCNC: 39 U/L (ref 10–40)
ANION GAP SERPL CALCULATED.3IONS-SCNC: 9 MMOL/L (ref 3–16)
ANTIBODY SCREEN: NORMAL
AST SERPL-CCNC: 26 U/L (ref 15–37)
BILIRUB SERPL-MCNC: 0.4 MG/DL (ref 0–1)
BUN BLDV-MCNC: 15 MG/DL (ref 7–20)
CALCIUM SERPL-MCNC: 9.2 MG/DL (ref 8.3–10.6)
CHLORIDE BLD-SCNC: 105 MMOL/L (ref 99–110)
CO2: 27 MMOL/L (ref 21–32)
CREAT SERPL-MCNC: 0.7 MG/DL (ref 0.6–1.1)
EKG ATRIAL RATE: 76 BPM
EKG DIAGNOSIS: NORMAL
EKG P AXIS: 24 DEGREES
EKG P-R INTERVAL: 140 MS
EKG Q-T INTERVAL: 400 MS
EKG QRS DURATION: 82 MS
EKG QTC CALCULATION (BAZETT): 450 MS
EKG R AXIS: 1 DEGREES
EKG T AXIS: 19 DEGREES
EKG VENTRICULAR RATE: 76 BPM
GFR SERPL CREATININE-BSD FRML MDRD: >60 ML/MIN/{1.73_M2}
GLUCOSE BLD-MCNC: 88 MG/DL (ref 70–99)
HCT VFR BLD CALC: 42.9 % (ref 36–48)
HEMOGLOBIN: 14.6 G/DL (ref 12–16)
MCH RBC QN AUTO: 28.9 PG (ref 26–34)
MCHC RBC AUTO-ENTMCNC: 34 G/DL (ref 31–36)
MCV RBC AUTO: 84.9 FL (ref 80–100)
PDW BLD-RTO: 12.7 % (ref 12.4–15.4)
PLATELET # BLD: 214 K/UL (ref 135–450)
PMV BLD AUTO: 9.6 FL (ref 5–10.5)
POTASSIUM SERPL-SCNC: 4.4 MMOL/L (ref 3.5–5.1)
RBC # BLD: 5.05 M/UL (ref 4–5.2)
SODIUM BLD-SCNC: 141 MMOL/L (ref 136–145)
TOTAL PROTEIN: 7.1 G/DL (ref 6.4–8.2)
WBC # BLD: 4.4 K/UL (ref 4–11)

## 2023-03-02 PROCEDURE — 86850 RBC ANTIBODY SCREEN: CPT

## 2023-03-02 PROCEDURE — 86900 BLOOD TYPING SEROLOGIC ABO: CPT

## 2023-03-02 PROCEDURE — 80053 COMPREHEN METABOLIC PANEL: CPT

## 2023-03-02 PROCEDURE — 85027 COMPLETE CBC AUTOMATED: CPT

## 2023-03-02 PROCEDURE — 36415 COLL VENOUS BLD VENIPUNCTURE: CPT

## 2023-03-02 PROCEDURE — 86901 BLOOD TYPING SEROLOGIC RH(D): CPT

## 2023-03-02 PROCEDURE — 93005 ELECTROCARDIOGRAM TRACING: CPT | Performed by: INTERNAL MEDICINE

## 2023-03-02 NOTE — TELEPHONE ENCOUNTER
Pt called in wanting to discuss results of EKG that was done. Pt has some questions about the results. Pt call back number is [phone].

## 2023-03-02 NOTE — TELEPHONE ENCOUNTER
BC/BS approval for sleeve submitted    CPT [id number]   DOS 3/22/23    EGD being done on 3/3/23.   Will need to submit

## 2023-03-02 NOTE — TELEPHONE ENCOUNTER
Called and spoke with patient. Let her know that Dr. Sharon Monet and I reviewed the EKG and that this was very similar to her one last May when she saw cardiology. The possible inferior infarct was listed on that one as well. She had a stress test and ECHO at that point as well that required no further treatment. Patient was given cardiac clearance this January for surgery by Dr. Guillermina Alcantar. Patient did complete a release of information so the EKG could be sent to Dr. Guillermina Alcantar for him to review.     Thank you,  Gia Amato, NP-C

## 2023-03-03 ENCOUNTER — ANESTHESIA EVENT (OUTPATIENT)
Dept: OPERATING ROOM | Age: 43
DRG: 621 | End: 2023-03-03
Payer: COMMERCIAL

## 2023-03-03 ENCOUNTER — ANESTHESIA EVENT (OUTPATIENT)
Dept: ENDOSCOPY | Age: 43
End: 2023-03-03
Payer: COMMERCIAL

## 2023-03-03 ENCOUNTER — HOSPITAL ENCOUNTER (OUTPATIENT)
Age: 43
Setting detail: OUTPATIENT SURGERY
Discharge: HOME | End: 2023-03-03
Attending: SURGERY | Admitting: SURGERY
Payer: COMMERCIAL

## 2023-03-03 ENCOUNTER — ANESTHESIA (OUTPATIENT)
Dept: ENDOSCOPY | Age: 43
End: 2023-03-03
Payer: COMMERCIAL

## 2023-03-03 VITALS
HEIGHT: 66 IN | TEMPERATURE: 98 F | SYSTOLIC BLOOD PRESSURE: 140 MMHG | RESPIRATION RATE: 16 BRPM | WEIGHT: 228 LBS | DIASTOLIC BLOOD PRESSURE: 92 MMHG | BODY MASS INDEX: 36.64 KG/M2 | OXYGEN SATURATION: 98 % | HEART RATE: 63 BPM

## 2023-03-03 DIAGNOSIS — Z01.818 PREOP TESTING: Primary | ICD-10-CM

## 2023-03-03 DIAGNOSIS — K21.9 GASTROESOPHAGEAL REFLUX DISEASE, UNSPECIFIED WHETHER ESOPHAGITIS PRESENT: ICD-10-CM

## 2023-03-03 LAB — HCG(URINE) PREGNANCY TEST: NEGATIVE

## 2023-03-03 PROCEDURE — 2500000003 HC RX 250 WO HCPCS: Performed by: NURSE ANESTHETIST, CERTIFIED REGISTERED

## 2023-03-03 PROCEDURE — 2709999900 HC NON-CHARGEABLE SUPPLY: Performed by: SURGERY

## 2023-03-03 PROCEDURE — 84703 CHORIONIC GONADOTROPIN ASSAY: CPT

## 2023-03-03 PROCEDURE — 2580000003 HC RX 258: Performed by: SURGERY

## 2023-03-03 PROCEDURE — 6360000002 HC RX W HCPCS: Performed by: NURSE ANESTHETIST, CERTIFIED REGISTERED

## 2023-03-03 PROCEDURE — 7100000011 HC PHASE II RECOVERY - ADDTL 15 MIN: Performed by: SURGERY

## 2023-03-03 PROCEDURE — 7100000010 HC PHASE II RECOVERY - FIRST 15 MIN: Performed by: SURGERY

## 2023-03-03 PROCEDURE — 88305 TISSUE EXAM BY PATHOLOGIST: CPT

## 2023-03-03 PROCEDURE — 3700000000 HC ANESTHESIA ATTENDED CARE: Performed by: SURGERY

## 2023-03-03 PROCEDURE — 43239 EGD BIOPSY SINGLE/MULTIPLE: CPT | Performed by: SURGERY

## 2023-03-03 PROCEDURE — 3609012400 HC EGD TRANSORAL BIOPSY SINGLE/MULTIPLE: Performed by: SURGERY

## 2023-03-03 RX ORDER — SUCRALFATE ORAL 1 G/10ML
1 SUSPENSION ORAL 4 TIMES DAILY
Qty: 600 ML | Refills: 0 | Status: SHIPPED | OUTPATIENT
Start: 2023-03-03 | End: 2023-03-18

## 2023-03-03 RX ORDER — FENTANYL CITRATE 50 UG/ML
INJECTION, SOLUTION INTRAMUSCULAR; INTRAVENOUS PRN
Status: DISCONTINUED | OUTPATIENT
Start: 2023-03-03 | End: 2023-03-03 | Stop reason: SDUPTHER

## 2023-03-03 RX ORDER — SODIUM CHLORIDE 9 MG/ML
INJECTION, SOLUTION INTRAVENOUS CONTINUOUS
Status: DISCONTINUED | OUTPATIENT
Start: 2023-03-03 | End: 2023-03-03 | Stop reason: HOSPADM

## 2023-03-03 RX ORDER — KETAMINE HYDROCHLORIDE 10 MG/ML
INJECTION INTRAMUSCULAR; INTRAVENOUS PRN
Status: DISCONTINUED | OUTPATIENT
Start: 2023-03-03 | End: 2023-03-03 | Stop reason: SDUPTHER

## 2023-03-03 RX ORDER — LIDOCAINE HYDROCHLORIDE 20 MG/ML
INJECTION, SOLUTION INFILTRATION; PERINEURAL PRN
Status: DISCONTINUED | OUTPATIENT
Start: 2023-03-03 | End: 2023-03-03 | Stop reason: SDUPTHER

## 2023-03-03 RX ORDER — OMEPRAZOLE 20 MG/1
20 CAPSULE, DELAYED RELEASE ORAL
Qty: 90 CAPSULE | Refills: 1 | Status: SHIPPED | OUTPATIENT
Start: 2023-03-03

## 2023-03-03 RX ORDER — PROPOFOL 10 MG/ML
INJECTION, EMULSION INTRAVENOUS PRN
Status: DISCONTINUED | OUTPATIENT
Start: 2023-03-03 | End: 2023-03-03 | Stop reason: SDUPTHER

## 2023-03-03 RX ADMIN — FENTANYL CITRATE 50 MCG: 50 INJECTION, SOLUTION INTRAMUSCULAR; INTRAVENOUS at 07:40

## 2023-03-03 RX ADMIN — SODIUM CHLORIDE: 9 INJECTION, SOLUTION INTRAVENOUS at 06:40

## 2023-03-03 RX ADMIN — KETAMINE HYDROCHLORIDE 20 MG: 10 INJECTION, SOLUTION INTRAMUSCULAR; INTRAVENOUS at 07:40

## 2023-03-03 RX ADMIN — PROPOFOL 50 MG: 10 INJECTION, EMULSION INTRAVENOUS at 07:40

## 2023-03-03 RX ADMIN — PROPOFOL 30 MG: 10 INJECTION, EMULSION INTRAVENOUS at 07:43

## 2023-03-03 RX ADMIN — FENTANYL CITRATE 25 MCG: 50 INJECTION, SOLUTION INTRAMUSCULAR; INTRAVENOUS at 07:43

## 2023-03-03 RX ADMIN — LIDOCAINE HYDROCHLORIDE 50 MG: 20 INJECTION, SOLUTION INFILTRATION; PERINEURAL at 07:40

## 2023-03-03 RX ADMIN — FENTANYL CITRATE 25 MCG: 50 INJECTION, SOLUTION INTRAMUSCULAR; INTRAVENOUS at 07:46

## 2023-03-03 ASSESSMENT — PAIN - FUNCTIONAL ASSESSMENT
PAIN_FUNCTIONAL_ASSESSMENT: ACTIVITIES ARE NOT PREVENTED
PAIN_FUNCTIONAL_ASSESSMENT: 0-10

## 2023-03-03 ASSESSMENT — LIFESTYLE VARIABLES: SMOKING_STATUS: 0

## 2023-03-03 NOTE — H&P
Department of [facility] [address] [facility]   [employer]  Attending Pre-operative History and Physical      DIAGNOSIS:  Obesity    INDICATION:  Pre-op    PROCEDURE:  EGD    CHIEF COMPLAINT:  Obesity    History Obtained From:  patient    HISTORY OF PRESENT ILLNESS:    The patient is a 43 y.o. female with significant past medical history of   Patient Active Problem List   Diagnosis    PCOS (polycystic ovarian syndrome)    Anxiety    Post traumatic stress disorder (PTSD)    Severe obesity (BMI 35.0-39. 9) with comorbidity ([address]    Snoring    Mixed hyperlipidemia    Hypertension affecting pregnancy, antepartum    H/O  section    Chronic hypertension    Morbid obesity with BMI of 40.0-44.9, adult ([address]    Chronic GERD      who presents for pre-op EGD    Past Medical History:        Diagnosis Date    Depression     Hypertension     chronic htn- on meds    Obesity (BMI 35.0-39.9 without comorbidity) 2017    PCOS (polycystic ovarian syndrome) 2017    PONV (postoperative nausea and vomiting)     Prolonged emergence from general anesthesia     Thyroid disease     hx of hypothyroid- on synthroid    Urinary incontinence      Past Surgical History:        Procedure Laterality Date     SECTION  2014    WISDOM TOOTH EXTRACTION       Medications Prior to Admission:   Medications Prior to Admission: Biotin 2500 MCG CAPS, Take by mouth daily (with breakfast)  Multiple Vitamins-Minerals (THERAPEUTIC MULTIVITAMIN-MINERALS) tablet, Take 1 tablet by mouth daily  Levonorgestrel (MIRENA, 52 MG, IU), by IntraUTERine route    Allergies:  Latex, Aspartame and phenylalanine, Avocado, Banana, Hazelnut (filbert) allergy skin test, Kiwi extract, Nickel, Pineapple, and Vicodin [hydrocodone-acetaminophen]    Social History:   TOBACCO:   reports that she has never smoked.  She has never used smokeless tobacco.  ETOH:   reports that she does not currently use alcohol. Family History:       Problem Relation Age of Onset    Mental Illness Mother     Elevated Lipids Mother     Arthritis Mother     Cancer Mother     Depression Mother     High Blood Pressure Mother     Miscarriages / [place] Mother     Mental Illness Father     Arthritis Father     Hearing Loss Father     High Blood Pressure Father     Alcohol Abuse Father     Osteoporosis Father     Birth Defects Brother     Depression Brother     Mental Illness Brother     Diabetes Maternal Grandfather     Heart Disease Maternal Grandfather     Coronary Art Dis Maternal Grandfather     Heart Disease Paternal Grandmother     Stroke Paternal Grandmother     Vision Loss Paternal Grandmother     Mental Illness Paternal Grandmother     Alcohol Abuse Paternal Grandfather          REVIEW OF SYSTEMS:    Review of Systems - History obtained from the patient  General ROS: negative  Psychological ROS: negative  Ophthalmic ROS: negative  Neurological ROS: negative  ENT ROS: negative  Allergy and Immunology ROS: negative  Hematological and Lymphatic ROS: negative  Endocrine ROS: negative  Breast ROS: negative  Respiratory ROS: negative  Cardiovascular ROS: negative  Gastrointestinal ROS:negative  Genito-Urinary ROS: negative  Musculoskeletal ROS: negative   Skin ROS: negative      PHYSICAL EXAM:      BP (!) 142/84   Pulse 73   Temp 97.2 °F (36.2 °C) (Temporal)   Resp 18   Ht 5' 6\" (1.676 m)   Wt 228 lb (103.4 kg)   LMP 02/20/2023 Comment: mirena ring  SpO2 99%   BMI 36.80 kg/m²  I      Physical Exam   Vitals Reviewed   Constitutional: Patient is oriented to person, place, and time. Patient appears well-developed and well-nourished. Patient is active and cooperative. Non-toxic appearance. No distress. HENT:   Head: Normocephalic and atraumatic. Head is without abrasion and without laceration. Hair is normal.   Right Ear: External ear normal. No lacerations. No drainage, swelling .    Left Ear: External ear normal. No lacerations. No drainage, swelling. Nose: Nose normal. No nose lacerations or nasal deformity. Eyes: Conjunctivae, EOM and lids are normal. Right eye exhibits no discharge. No foreign body present in the right eye. Left eye exhibits no discharge. No foreign body present in the left eye. No scleral icterus. Neck: Trachea normal and normal range of motion. No JVD present. Pulmonary/Chest: Effort normal. No accessory muscle usage or stridor. No apnea. No respiratory distress. Cardiovascular: Normal rate and no JVD. Abdominal: Normal appearance. Patient exhibits no distension. Musculoskeletal: Normal range of motion. Patient exhibits no edema. Neurological: Patient is alert and oriented to person, place, and time. Patient has normal strength. GCS eye subscore is 4. GCS verbal subscore is 5. GCS motor subscore is 6. Skin: Skin is warm and dry. No abrasion and no rash noted. Patient is not diaphoretic. No cyanosis or erythema. Psychiatric: Patient has a normal mood and affect.  Speech is normal and behavior is normal. Cognition and memory are normal.       DATA:  CBC:   Lab Results   Component Value Date/Time    WBC 4.4 03/02/2023 08:09 AM    RBC 5.05 03/02/2023 08:09 AM    HGB 14.6 03/02/2023 08:09 AM    HCT 42.9 03/02/2023 08:09 AM    MCV 84.9 03/02/2023 08:09 AM    MCH 28.9 03/02/2023 08:09 AM    MCHC 34.0 03/02/2023 08:09 AM    RDW 12.7 03/02/2023 08:09 AM     03/02/2023 08:09 AM    MPV 9.6 03/02/2023 08:09 AM     CMP:    Lab Results   Component Value Date/Time     03/02/2023 08:09 AM    K 4.4 03/02/2023 08:09 AM     03/02/2023 08:09 AM    CO2 27 03/02/2023 08:09 AM    BUN 15 03/02/2023 08:09 AM    CREATININE 0.7 03/02/2023 08:09 AM    GFRAA >60 08/02/2018 05:10 AM    AGRATIO 1.4 03/02/2023 08:09 AM    LABGLOM >60 03/02/2023 08:09 AM    GLUCOSE 88 03/02/2023 08:09 AM    PROT 7.1 03/02/2023 08:09 AM    LABALBU 4.2 03/02/2023 08:09 AM    CALCIUM 9.2 03/02/2023 08:09 AM    BILITOT 0.4 03/02/2023 08:09 AM    ALKPHOS 67 03/02/2023 08:09 AM    AST 26 03/02/2023 08:09 AM    ALT 39 03/02/2023 08:09 AM       ASSESSMENT AND PLAN:      Obesity: Body mass index is 36.8 kg/m². [x] Continue to make dietary and lifestyle modifications. [x] Plan for Future laparoscopic sleeve gastrectomy. [x] Return for follow-up. Chronic GERD:   [x] Continue to make dietary and lifestyle modifications. [] Continue Omeprazole. [] Continue Famotidine. [x] Plan for EGD to evaluate the stomach. I did explain thoroughly to the patient that compliance with pre- and post op diet and other recommendations are integral part to improve the chances of successful weight loss and also not following it could end with serious health complications. Some strategies discussed today include but not limited to : 30/30/30 minutes rule, food diary, avoid fast food and packing/planing ahead, & increasing exercise. 1.  Patient is a 43 y.o. female with above specified procedure planned EGD with deep sedation  2. Procedure options, risks and benefits reviewed with patient. Patient expresses understanding.       Electronically signed by Carlos Jenkins MD , FACS, FASMBS  on 3/3/2023 at 7:40 AM

## 2023-03-03 NOTE — ADDENDUM NOTE
Addendum  created 03/03/23 0907 by ANIYAH Cavanaugh CRNA    Intraprocedure Meds edited, Orders acknowledged in Narrator

## 2023-03-03 NOTE — ANESTHESIA PRE PROCEDURE
Department of Anesthesiology  Preprocedure Note       Name:  Mireille Zapata   Age:  43 y.o.  :  1980                                          MRN:  [id number]         Date:  3/3/2023      Surgeon: Sarah Vazquez): Mary Mcqueen MD    Procedure: Procedure(s):  EGD DIAGNOSTIC ONLY    Medications prior to admission:   Prior to Admission medications    Medication Sig Start Date End Date Taking? Authorizing Provider   Biotin 2500 MCG CAPS Take by mouth daily (with breakfast)   Yes Historical Provider, MD   Multiple Vitamins-Minerals (THERAPEUTIC MULTIVITAMIN-MINERALS) tablet Take 1 tablet by mouth daily   Yes Historical Provider, MD   Levonorgestrel (MIRENA, 52 MG, IU) by IntraUTERine route    Historical Provider, MD       Current medications:    Current Facility-Administered Medications   Medication Dose Route Frequency Provider Last Rate Last Admin    0.9 % sodium chloride infusion   IntraVENous Continuous Mary Mcqueen  mL/hr at 23 0640 New Bag at 23 0640       Allergies: Allergies   Allergen Reactions    Latex Rash     TONGUE SWELLING    Aspartame And Phenylalanine Diarrhea    Avocado Swelling    Banana      Stomach pain    Hazelnut (Filbert) Allergy Skin Test Swelling    Kiwi Extract     Nickel      Rash from long term exposure    Pineapple Swelling    Vicodin [Hydrocodone-Acetaminophen] Nausea And Vomiting       Problem List:    Patient Active Problem List   Diagnosis Code    PCOS (polycystic ovarian syndrome) E28.2    Anxiety F41.9    Post traumatic stress disorder (PTSD) F43.10    Severe obesity (BMI 35.0-39. 9) with comorbidity ([facility]) E66.01    Snoring R06.83    Mixed hyperlipidemia E78.2    Hypertension affecting pregnancy, antepartum O16.9    H/O  section Z98.891    Chronic hypertension I10    Morbid obesity with BMI of 40.0-44.9, adult ([facility]) E66.01, Z68.41    Chronic GERD K21.9       Past Medical History:        Diagnosis Date    Depression     Hypertension     chronic htn- on meds    Obesity (BMI 35.0-39.9 without comorbidity) 2017    PCOS (polycystic ovarian syndrome) 2017    PONV (postoperative nausea and vomiting)     Prolonged emergence from general anesthesia     Thyroid disease     hx of hypothyroid- on synthroid    Urinary incontinence        Past Surgical History:        Procedure Laterality Date     SECTION  2014    WISDOM TOOTH EXTRACTION         Social History:    Social History     Tobacco Use    Smoking status: Never    Smokeless tobacco: Never   Substance Use Topics    Alcohol use: Not Currently                                Counseling given: Not Answered      Vital Signs (Current):   Vitals:    23 1151 23 0622   BP:  (!) 142/84   Pulse:  73   Resp:  18   Temp:  97.2 °F (36.2 °C)   TempSrc:  Temporal   SpO2:  99%   Weight: 231 lb (104.8 kg) 228 lb (103.4 kg)   Height: 5' 6\" (1.676 m) 5' 6\" (1.676 m)                                              BP Readings from Last 3 Encounters:   23 (!) 142/84   23 130/70   23 130/78       NPO Status: Time of last liquid consumption:                         Time of last solid consumption:                         Date of last liquid consumption: 23                        Date of last solid food consumption: 23    BMI:   Wt Readings from Last 3 Encounters:   23 228 lb (103.4 kg)   23 235 lb 3.2 oz (106.7 kg)   23 237 lb 9.6 oz (107.8 kg)     Body mass index is 36.8 kg/m².     CBC:   Lab Results   Component Value Date/Time    WBC 4.4 2023 08:09 AM    RBC 5.05 2023 08:09 AM    HGB 14.6 2023 08:09 AM    HCT 42.9 2023 08:09 AM    MCV 84.9 2023 08:09 AM    RDW 12.7 2023 08:09 AM     2023 08:09 AM       CMP:   Lab Results   Component Value Date/Time     2023 08:09 AM    K 4.4 2023 08:09 AM     2023 08:09 AM    CO2 27 2023 08:09 AM BUN 15 03/02/2023 08:09 AM    CREATININE 0.7 03/02/2023 08:09 AM    GFRAA >60 08/02/2018 05:10 AM    AGRATIO 1.4 03/02/2023 08:09 AM    LABGLOM >60 03/02/2023 08:09 AM    GLUCOSE 88 03/02/2023 08:09 AM    PROT 7.1 03/02/2023 08:09 AM    CALCIUM 9.2 03/02/2023 08:09 AM    BILITOT 0.4 03/02/2023 08:09 AM    ALKPHOS 67 03/02/2023 08:09 AM    AST 26 03/02/2023 08:09 AM    ALT 39 03/02/2023 08:09 AM       POC Tests: No results for input(s): POCGLU, POCNA, POCK, POCCL, POCBUN, POCHEMO, POCHCT in the last 72 hours. Coags:   Lab Results   Component Value Date/Time    PROTIME 13.4 01/17/2023 11:30 AM    INR 1.03 01/17/2023 11:30 AM    APTT 28.5 08/01/2018 06:40 AM       HCG (If Applicable):   Lab Results   Component Value Date    PREGTESTUR Negative 03/03/2023        ABGs: No results found for: PHART, PO2ART, PML0XHF, AEX1YXN, BEART, U4WNHYUH     Type & Screen (If Applicable):  No results found for: LABABO, LABRH    Drug/Infectious Status (If Applicable):  No results found for: HIV, HEPCAB    COVID-19 Screening (If Applicable): No results found for: COVID19        Anesthesia Evaluation  Patient summary reviewed and Nursing notes reviewed   history of anesthetic complications (PONV, prolonged emergence): PONV.   Airway: Mallampati: III  TM distance: <3 FB   Neck ROM: full  Mouth opening: > = 3 FB   Dental: normal exam         Pulmonary:Negative Pulmonary ROS and normal exam  breath sounds clear to auscultation      (-) COPD, asthma, sleep apnea and not a current smoker                           Cardiovascular:    (+) hypertension:, hyperlipidemia    (-) past MI, CAD (neg stress test 2022), CABG/stent, dysrhythmias,  angina and  CHF (echo 2022 EF 55-60 no rwma mild ar)    ECG reviewed  Rhythm: regular  Rate: normal  Echocardiogram reviewed  Stress test reviewed                Neuro/Psych:   (+) psychiatric history (PTSD):depression/anxiety    (-) seizures, TIA and CVA           GI/Hepatic/Renal:   (+) GERD:,      (-) liver disease and no renal disease       Endo/Other:        (-) diabetes mellitus, hypothyroidism, hyperthyroidism                ROS comment: PCOS Abdominal:   (+) obese,           Vascular: Other Findings:           Anesthesia Plan      MAC     ASA 2       Induction: intravenous. Anesthetic plan and risks discussed with patient. Plan discussed with CRNA.                     Tamika Espinoza MD   3/3/2023

## 2023-03-03 NOTE — PROGRESS NOTES
Pt arrived from ENDO to PACU bay 7. Report received from ENDO staff. Pt arouses to voice. A&O x 4. Pt on RA. Pt denies pain and nausea at time of arrival to PACU. Pt ASA 2. Pt received MAC anesthesia during the procedure. Pt meets criteria to be moved directly to phase II. Will continue to monitor.

## 2023-03-03 NOTE — PROGRESS NOTES
Discharge instructions review with patient. All home medications have been reviewed, pt v/u. Discharge instructions signed. Pt discharged via wheelchair. Pt discharged with all belongings.  Vikash Ramírez taking stable pt home.

## 2023-03-03 NOTE — ANESTHESIA POSTPROCEDURE EVALUATION
Department of Anesthesiology  Postprocedure Note    Patient: Stephanie Strong  MRN: [id number]  YOB: 1980  Date of evaluation: 3/3/2023      Procedure Summary     Date: 03/03/23 Room / Location: [address] [place] [address]    Anesthesia Start: 1100 Anesthesia Stop: 6184    Procedure: EGD BIOPSY (Abdomen) Diagnosis:       Gastroesophageal reflux disease, unspecified whether esophagitis present      (Gastroesophageal reflux disease, unspecified whether esophagitis present [K21.9])    Surgeons: Medhat Del Castillo MD Responsible Provider: Justine Baker MD    Anesthesia Type: MAC ASA Status: 2          Anesthesia Type: No value filed.     Ahmet Phase I: Ahmet Score: 10    Ahmet Phase II: Ahmet Score: 10      Anesthesia Post Evaluation    Patient location during evaluation: PACU  Patient participation: complete - patient participated  Level of consciousness: awake and alert  Airway patency: patent  Nausea & Vomiting: no vomiting and no nausea  Complications: no  Cardiovascular status: hemodynamically stable  Respiratory status: acceptable  Hydration status: stable

## 2023-03-03 NOTE — DISCHARGE INSTRUCTIONS
ENDOSCOPY DISCHARGE INSTRUCTIONS    You may experience some lightheadedness for the next several hours. Plan on quiet relaxation for the rest of today. A responsible adult needs to stay with you today. Because of the medications you received today-do not drive,operate machinery,or sign any contractual agreement for the next 24 hours. Do not drink any alcoholic beverages or take any unprescribed medications tonight. Eat bland food and avoid anything greasy or spicy initially-progress to your normal diet gradually. Diet restrictions as instructed. If you have any of the following problems, notify your physician or return to the hospital emergency room : fever, chills, excessive bleeding, excessive vomiting, difficulty swallowing, uncontrolled pain, increased abdominal distention, shortness of breath or any other problems. If you have a sore throat, you may use lozenges or salt water gargles. Please call Dr. [facility] office in 5 business days for biopsy results [phone]. ANESTHESIA DISCHARGE INSTRUCTIONS    Wear your seatbelt home. You are under the influence of drugs-do not drink alcohol,drive,operate machinery,or make any important decisions or sign any legal documentsfor 24 hours  Children should not ride [employer] or play on gym sets  for 24 hours after surgery. A responsible adult needs to be with you for 24 hours. You may experience lightheadedness,dizziness,or sleepiness following surgery. Rest at home today- increase activity as tolerated. Progress slowly to a regular diet unless your physician has instructed you otherwise. Drink plenty of water. If nausea becomes a problem call your physician. Coughing,sore throat,and muscle aches are other side effects of anesthesia,and should improve with time. Do not drive,operate machinery while taking narcotics.

## 2023-03-08 ENCOUNTER — TELEPHONE (OUTPATIENT)
Dept: BARIATRICS/WEIGHT MGMT | Age: 43
End: 2023-03-08

## 2023-03-15 RX ORDER — SUMATRIPTAN 50 MG/1
TABLET, FILM COATED ORAL
COMMUNITY
Start: 2023-03-06

## 2023-03-22 ENCOUNTER — ANESTHESIA (OUTPATIENT)
Dept: OPERATING ROOM | Age: 43
DRG: 621 | End: 2023-03-22
Payer: COMMERCIAL

## 2023-03-22 ENCOUNTER — HOSPITAL ENCOUNTER (INPATIENT)
Age: 43
LOS: 1 days | Discharge: HOME | DRG: 621 | End: 2023-03-23
Attending: SURGERY | Admitting: SURGERY
Payer: COMMERCIAL

## 2023-03-22 DIAGNOSIS — E66.01 MORBID OBESITY (HCC): ICD-10-CM

## 2023-03-22 DIAGNOSIS — Z98.84 S/P LAPAROSCOPIC SLEEVE GASTRECTOMY: Primary | ICD-10-CM

## 2023-03-22 DIAGNOSIS — K44.9 HIATAL HERNIA: ICD-10-CM

## 2023-03-22 LAB
ABO + RH BLD: NORMAL
BLD GP AB SCN SERPL QL: NORMAL
GLUCOSE BLD-MCNC: 92 MG/DL (ref 70–99)
HCG UR QL: NEGATIVE
PERFORMED ON: NORMAL

## 2023-03-22 PROCEDURE — 6360000002 HC RX W HCPCS: Performed by: SURGERY

## 2023-03-22 PROCEDURE — 86850 RBC ANTIBODY SCREEN: CPT

## 2023-03-22 PROCEDURE — 7100000001 HC PACU RECOVERY - ADDTL 15 MIN: Performed by: SURGERY

## 2023-03-22 PROCEDURE — 2500000003 HC RX 250 WO HCPCS: Performed by: NURSE ANESTHETIST, CERTIFIED REGISTERED

## 2023-03-22 PROCEDURE — 6360000002 HC RX W HCPCS: Performed by: ANESTHESIOLOGY

## 2023-03-22 PROCEDURE — 3600000005 HC SURGERY LEVEL 5 BASE: Performed by: SURGERY

## 2023-03-22 PROCEDURE — 2720000010 HC SURG SUPPLY STERILE: Performed by: SURGERY

## 2023-03-22 PROCEDURE — 2500000003 HC RX 250 WO HCPCS: Performed by: SURGERY

## 2023-03-22 PROCEDURE — 0DB64Z3 EXCISION OF STOMACH, PERCUTANEOUS ENDOSCOPIC APPROACH, VERTICAL: ICD-10-PCS | Performed by: SURGERY

## 2023-03-22 PROCEDURE — A4217 STERILE WATER/SALINE, 500 ML: HCPCS | Performed by: SURGERY

## 2023-03-22 PROCEDURE — 88307 TISSUE EXAM BY PATHOLOGIST: CPT

## 2023-03-22 PROCEDURE — 6370000000 HC RX 637 (ALT 250 FOR IP): Performed by: SURGERY

## 2023-03-22 PROCEDURE — 2500000003 HC RX 250 WO HCPCS: Performed by: ANESTHESIOLOGY

## 2023-03-22 PROCEDURE — 2580000003 HC RX 258: Performed by: SURGERY

## 2023-03-22 PROCEDURE — 2580000003 HC RX 258: Performed by: NURSE ANESTHETIST, CERTIFIED REGISTERED

## 2023-03-22 PROCEDURE — 0BQT4ZZ REPAIR DIAPHRAGM, PERCUTANEOUS ENDOSCOPIC APPROACH: ICD-10-PCS | Performed by: SURGERY

## 2023-03-22 PROCEDURE — 94150 VITAL CAPACITY TEST: CPT

## 2023-03-22 PROCEDURE — C9113 INJ PANTOPRAZOLE SODIUM, VIA: HCPCS | Performed by: SURGERY

## 2023-03-22 PROCEDURE — 6360000002 HC RX W HCPCS: Performed by: NURSE ANESTHETIST, CERTIFIED REGISTERED

## 2023-03-22 PROCEDURE — 3700000000 HC ANESTHESIA ATTENDED CARE: Performed by: SURGERY

## 2023-03-22 PROCEDURE — 3700000001 HC ADD 15 MINUTES (ANESTHESIA): Performed by: SURGERY

## 2023-03-22 PROCEDURE — 2709999900 HC NON-CHARGEABLE SUPPLY: Performed by: SURGERY

## 2023-03-22 PROCEDURE — 36415 COLL VENOUS BLD VENIPUNCTURE: CPT

## 2023-03-22 PROCEDURE — 7100000000 HC PACU RECOVERY - FIRST 15 MIN: Performed by: SURGERY

## 2023-03-22 PROCEDURE — 86901 BLOOD TYPING SEROLOGIC RH(D): CPT

## 2023-03-22 PROCEDURE — 84703 CHORIONIC GONADOTROPIN ASSAY: CPT

## 2023-03-22 PROCEDURE — 1200000000 HC SEMI PRIVATE

## 2023-03-22 PROCEDURE — 3600000015 HC SURGERY LEVEL 5 ADDTL 15MIN: Performed by: SURGERY

## 2023-03-22 PROCEDURE — 86900 BLOOD TYPING SEROLOGIC ABO: CPT

## 2023-03-22 RX ORDER — SODIUM CHLORIDE 0.9 % (FLUSH) 0.9 %
5-40 SYRINGE (ML) INJECTION EVERY 12 HOURS SCHEDULED
Status: DISCONTINUED | OUTPATIENT
Start: 2023-03-22 | End: 2023-03-23 | Stop reason: HOSPADM

## 2023-03-22 RX ORDER — SCOLOPAMINE TRANSDERMAL SYSTEM 1 MG/1
1 PATCH, EXTENDED RELEASE TRANSDERMAL
Status: DISCONTINUED | OUTPATIENT
Start: 2023-03-25 | End: 2023-03-23 | Stop reason: HOSPADM

## 2023-03-22 RX ORDER — SCOLOPAMINE TRANSDERMAL SYSTEM 1 MG/1
1 PATCH, EXTENDED RELEASE TRANSDERMAL ONCE
Status: DISCONTINUED | OUTPATIENT
Start: 2023-03-22 | End: 2023-03-22

## 2023-03-22 RX ORDER — HYDRALAZINE HYDROCHLORIDE 20 MG/ML
10 INJECTION INTRAMUSCULAR; INTRAVENOUS
Status: DISCONTINUED | OUTPATIENT
Start: 2023-03-22 | End: 2023-03-23 | Stop reason: HOSPADM

## 2023-03-22 RX ORDER — FENTANYL CITRATE 50 UG/ML
25 INJECTION, SOLUTION INTRAMUSCULAR; INTRAVENOUS EVERY 5 MIN PRN
Status: DISCONTINUED | OUTPATIENT
Start: 2023-03-22 | End: 2023-03-22 | Stop reason: HOSPADM

## 2023-03-22 RX ORDER — METOCLOPRAMIDE HYDROCHLORIDE 5 MG/ML
10 INJECTION INTRAMUSCULAR; INTRAVENOUS EVERY 6 HOURS PRN
Status: DISCONTINUED | OUTPATIENT
Start: 2023-03-22 | End: 2023-03-23

## 2023-03-22 RX ORDER — SODIUM CHLORIDE 0.9 % (FLUSH) 0.9 %
5-40 SYRINGE (ML) INJECTION PRN
Status: DISCONTINUED | OUTPATIENT
Start: 2023-03-22 | End: 2023-03-23 | Stop reason: HOSPADM

## 2023-03-22 RX ORDER — FENTANYL CITRATE 50 UG/ML
INJECTION, SOLUTION INTRAMUSCULAR; INTRAVENOUS PRN
Status: DISCONTINUED | OUTPATIENT
Start: 2023-03-22 | End: 2023-03-22 | Stop reason: SDUPTHER

## 2023-03-22 RX ORDER — METHYLENE BLUE 10 MG/ML
INJECTION INTRAVENOUS
Status: COMPLETED | OUTPATIENT
Start: 2023-03-22 | End: 2023-03-22

## 2023-03-22 RX ORDER — APREPITANT 80 MG/1
80 CAPSULE ORAL ONCE
Status: COMPLETED | OUTPATIENT
Start: 2023-03-22 | End: 2023-03-22

## 2023-03-22 RX ORDER — SODIUM CHLORIDE 0.9 % (FLUSH) 0.9 %
5-40 SYRINGE (ML) INJECTION PRN
Status: DISCONTINUED | OUTPATIENT
Start: 2023-03-22 | End: 2023-03-22 | Stop reason: HOSPADM

## 2023-03-22 RX ORDER — MIDAZOLAM HYDROCHLORIDE 1 MG/ML
INJECTION INTRAMUSCULAR; INTRAVENOUS
Status: COMPLETED
Start: 2023-03-22 | End: 2023-03-22

## 2023-03-22 RX ORDER — SODIUM CHLORIDE, SODIUM LACTATE, POTASSIUM CHLORIDE, CALCIUM CHLORIDE 600; 310; 30; 20 MG/100ML; MG/100ML; MG/100ML; MG/100ML
INJECTION, SOLUTION INTRAVENOUS CONTINUOUS
Status: DISCONTINUED | OUTPATIENT
Start: 2023-03-22 | End: 2023-03-22 | Stop reason: HOSPADM

## 2023-03-22 RX ORDER — ROCURONIUM BROMIDE 10 MG/ML
INJECTION, SOLUTION INTRAVENOUS PRN
Status: DISCONTINUED | OUTPATIENT
Start: 2023-03-22 | End: 2023-03-22 | Stop reason: SDUPTHER

## 2023-03-22 RX ORDER — LABETALOL HYDROCHLORIDE 5 MG/ML
10 INJECTION, SOLUTION INTRAVENOUS
Status: COMPLETED | OUTPATIENT
Start: 2023-03-22 | End: 2023-03-22

## 2023-03-22 RX ORDER — SODIUM CHLORIDE 0.9 % (FLUSH) 0.9 %
5-40 SYRINGE (ML) INJECTION EVERY 12 HOURS SCHEDULED
Status: DISCONTINUED | OUTPATIENT
Start: 2023-03-22 | End: 2023-03-22 | Stop reason: HOSPADM

## 2023-03-22 RX ORDER — NALOXONE HYDROCHLORIDE 0.4 MG/ML
INJECTION, SOLUTION INTRAMUSCULAR; INTRAVENOUS; SUBCUTANEOUS PRN
Status: DISCONTINUED | OUTPATIENT
Start: 2023-03-22 | End: 2023-03-23

## 2023-03-22 RX ORDER — FIBRINOGEN HUMAN AND THROMBIN HUMAN 1 ML
KIT TOPICAL
Status: COMPLETED | OUTPATIENT
Start: 2023-03-22 | End: 2023-03-22

## 2023-03-22 RX ORDER — ONDANSETRON 2 MG/ML
4 INJECTION INTRAMUSCULAR; INTRAVENOUS
Status: DISCONTINUED | OUTPATIENT
Start: 2023-03-22 | End: 2023-03-22 | Stop reason: HOSPADM

## 2023-03-22 RX ORDER — SODIUM CHLORIDE 9 MG/ML
INJECTION, SOLUTION INTRAVENOUS PRN
Status: DISCONTINUED | OUTPATIENT
Start: 2023-03-22 | End: 2023-03-23 | Stop reason: HOSPADM

## 2023-03-22 RX ORDER — PROPOFOL 10 MG/ML
INJECTION, EMULSION INTRAVENOUS PRN
Status: DISCONTINUED | OUTPATIENT
Start: 2023-03-22 | End: 2023-03-22 | Stop reason: SDUPTHER

## 2023-03-22 RX ORDER — ENOXAPARIN SODIUM 100 MG/ML
30 INJECTION SUBCUTANEOUS EVERY 12 HOURS SCHEDULED
Status: DISCONTINUED | OUTPATIENT
Start: 2023-03-22 | End: 2023-03-23 | Stop reason: HOSPADM

## 2023-03-22 RX ORDER — DIPHENHYDRAMINE HYDROCHLORIDE 50 MG/ML
12.5 INJECTION INTRAMUSCULAR; INTRAVENOUS EVERY 6 HOURS PRN
Status: DISCONTINUED | OUTPATIENT
Start: 2023-03-22 | End: 2023-03-23 | Stop reason: HOSPADM

## 2023-03-22 RX ORDER — LIDOCAINE HYDROCHLORIDE 10 MG/ML
1 INJECTION, SOLUTION EPIDURAL; INFILTRATION; INTRACAUDAL; PERINEURAL
Status: ACTIVE | OUTPATIENT
Start: 2023-03-22 | End: 2023-03-23

## 2023-03-22 RX ORDER — LIDOCAINE HYDROCHLORIDE 20 MG/ML
INJECTION, SOLUTION EPIDURAL; INFILTRATION; INTRACAUDAL; PERINEURAL PRN
Status: DISCONTINUED | OUTPATIENT
Start: 2023-03-22 | End: 2023-03-22 | Stop reason: SDUPTHER

## 2023-03-22 RX ORDER — MIDAZOLAM HYDROCHLORIDE 1 MG/ML
INJECTION INTRAMUSCULAR; INTRAVENOUS PRN
Status: DISCONTINUED | OUTPATIENT
Start: 2023-03-22 | End: 2023-03-22 | Stop reason: SDUPTHER

## 2023-03-22 RX ORDER — BUPIVACAINE HYDROCHLORIDE AND EPINEPHRINE 2.5; 5 MG/ML; UG/ML
INJECTION, SOLUTION EPIDURAL; INFILTRATION; INTRACAUDAL; PERINEURAL
Status: COMPLETED | OUTPATIENT
Start: 2023-03-22 | End: 2023-03-22

## 2023-03-22 RX ORDER — LIDOCAINE 4 G/G
1 PATCH TOPICAL DAILY
Status: DISCONTINUED | OUTPATIENT
Start: 2023-03-22 | End: 2023-03-23 | Stop reason: HOSPADM

## 2023-03-22 RX ORDER — SODIUM CHLORIDE 9 MG/ML
25 INJECTION, SOLUTION INTRAVENOUS PRN
Status: DISCONTINUED | OUTPATIENT
Start: 2023-03-22 | End: 2023-03-22 | Stop reason: HOSPADM

## 2023-03-22 RX ORDER — OXYCODONE HYDROCHLORIDE 5 MG/1
5 TABLET ORAL
Status: DISCONTINUED | OUTPATIENT
Start: 2023-03-22 | End: 2023-03-22 | Stop reason: HOSPADM

## 2023-03-22 RX ORDER — MAGNESIUM SULFATE HEPTAHYDRATE 500 MG/ML
INJECTION, SOLUTION INTRAMUSCULAR; INTRAVENOUS PRN
Status: DISCONTINUED | OUTPATIENT
Start: 2023-03-22 | End: 2023-03-22 | Stop reason: SDUPTHER

## 2023-03-22 RX ORDER — ACETAMINOPHEN 160 MG/5ML
650 SOLUTION ORAL ONCE
Status: COMPLETED | OUTPATIENT
Start: 2023-03-22 | End: 2023-03-22

## 2023-03-22 RX ORDER — ONDANSETRON 2 MG/ML
INJECTION INTRAMUSCULAR; INTRAVENOUS PRN
Status: DISCONTINUED | OUTPATIENT
Start: 2023-03-22 | End: 2023-03-22 | Stop reason: SDUPTHER

## 2023-03-22 RX ORDER — HYOSCYAMINE SULFATE 0.5 MG/ML
250 INJECTION, SOLUTION SUBCUTANEOUS EVERY 4 HOURS PRN
Status: DISCONTINUED | OUTPATIENT
Start: 2023-03-22 | End: 2023-03-23 | Stop reason: HOSPADM

## 2023-03-22 RX ORDER — DEXAMETHASONE SODIUM PHOSPHATE 4 MG/ML
INJECTION, SOLUTION INTRA-ARTICULAR; INTRALESIONAL; INTRAMUSCULAR; INTRAVENOUS; SOFT TISSUE PRN
Status: DISCONTINUED | OUTPATIENT
Start: 2023-03-22 | End: 2023-03-22 | Stop reason: SDUPTHER

## 2023-03-22 RX ORDER — PROCHLORPERAZINE EDISYLATE 5 MG/ML
5 INJECTION INTRAMUSCULAR; INTRAVENOUS
Status: DISCONTINUED | OUTPATIENT
Start: 2023-03-22 | End: 2023-03-22 | Stop reason: HOSPADM

## 2023-03-22 RX ORDER — HYDRALAZINE HYDROCHLORIDE 20 MG/ML
10 INJECTION INTRAMUSCULAR; INTRAVENOUS
Status: COMPLETED | OUTPATIENT
Start: 2023-03-22 | End: 2023-03-22

## 2023-03-22 RX ORDER — PROMETHAZINE HYDROCHLORIDE 25 MG/ML
6.25 INJECTION, SOLUTION INTRAMUSCULAR; INTRAVENOUS EVERY 6 HOURS PRN
Status: DISCONTINUED | OUTPATIENT
Start: 2023-03-22 | End: 2023-03-23 | Stop reason: HOSPADM

## 2023-03-22 RX ORDER — GLYCOPYRROLATE 0.2 MG/ML
INJECTION INTRAMUSCULAR; INTRAVENOUS PRN
Status: DISCONTINUED | OUTPATIENT
Start: 2023-03-22 | End: 2023-03-22 | Stop reason: SDUPTHER

## 2023-03-22 RX ORDER — SODIUM CHLORIDE, SODIUM LACTATE, POTASSIUM CHLORIDE, CALCIUM CHLORIDE 600; 310; 30; 20 MG/100ML; MG/100ML; MG/100ML; MG/100ML
INJECTION, SOLUTION INTRAVENOUS CONTINUOUS
Status: DISCONTINUED | OUTPATIENT
Start: 2023-03-22 | End: 2023-03-23 | Stop reason: HOSPADM

## 2023-03-22 RX ORDER — KETAMINE HCL IN NACL, ISO-OSM 100MG/10ML
SYRINGE (ML) INJECTION PRN
Status: DISCONTINUED | OUTPATIENT
Start: 2023-03-22 | End: 2023-03-22 | Stop reason: SDUPTHER

## 2023-03-22 RX ORDER — HYDROMORPHONE HCL 110MG/55ML
0.5 PATIENT CONTROLLED ANALGESIA SYRINGE INTRAVENOUS EVERY 5 MIN PRN
Status: COMPLETED | OUTPATIENT
Start: 2023-03-22 | End: 2023-03-22

## 2023-03-22 RX ORDER — LIDOCAINE HYDROCHLORIDE 10 MG/ML
0.5 INJECTION, SOLUTION EPIDURAL; INFILTRATION; INTRACAUDAL; PERINEURAL ONCE
Status: DISCONTINUED | OUTPATIENT
Start: 2023-03-22 | End: 2023-03-22 | Stop reason: HOSPADM

## 2023-03-22 RX ORDER — LABETALOL HYDROCHLORIDE 5 MG/ML
10 INJECTION, SOLUTION INTRAVENOUS
Status: DISCONTINUED | OUTPATIENT
Start: 2023-03-22 | End: 2023-03-23 | Stop reason: HOSPADM

## 2023-03-22 RX ORDER — ONDANSETRON 2 MG/ML
4 INJECTION INTRAMUSCULAR; INTRAVENOUS EVERY 6 HOURS PRN
Status: DISCONTINUED | OUTPATIENT
Start: 2023-03-22 | End: 2023-03-23 | Stop reason: HOSPADM

## 2023-03-22 RX ORDER — SODIUM CHLORIDE 9 MG/ML
INJECTION, SOLUTION INTRAVENOUS CONTINUOUS PRN
Status: DISCONTINUED | OUTPATIENT
Start: 2023-03-22 | End: 2023-03-22 | Stop reason: SDUPTHER

## 2023-03-22 RX ORDER — MAGNESIUM HYDROXIDE 1200 MG/15ML
LIQUID ORAL CONTINUOUS PRN
Status: COMPLETED | OUTPATIENT
Start: 2023-03-22 | End: 2023-03-22

## 2023-03-22 RX ORDER — HYDROMORPHONE HYDROCHLORIDE 1 MG/ML
0.5 INJECTION, SOLUTION INTRAMUSCULAR; INTRAVENOUS; SUBCUTANEOUS
Status: DISCONTINUED | OUTPATIENT
Start: 2023-03-22 | End: 2023-03-23 | Stop reason: HOSPADM

## 2023-03-22 RX ORDER — ENOXAPARIN SODIUM 100 MG/ML
30 INJECTION SUBCUTANEOUS ONCE
Status: COMPLETED | OUTPATIENT
Start: 2023-03-22 | End: 2023-03-22

## 2023-03-22 RX ADMIN — LABETALOL HYDROCHLORIDE 10 MG: 5 INJECTION INTRAVENOUS at 09:29

## 2023-03-22 RX ADMIN — DEXAMETHASONE SODIUM PHOSPHATE 8 MG: 4 INJECTION, SOLUTION INTRAMUSCULAR; INTRAVENOUS at 07:50

## 2023-03-22 RX ADMIN — PHENYLEPHRINE HYDROCHLORIDE 100 MCG: 10 INJECTION INTRAVENOUS at 08:27

## 2023-03-22 RX ADMIN — ROCURONIUM BROMIDE 50 MG: 10 INJECTION, SOLUTION INTRAVENOUS at 07:42

## 2023-03-22 RX ADMIN — MIDAZOLAM 2 MG: 1 INJECTION INTRAMUSCULAR; INTRAVENOUS at 07:33

## 2023-03-22 RX ADMIN — ENOXAPARIN SODIUM 30 MG: 100 INJECTION SUBCUTANEOUS at 06:30

## 2023-03-22 RX ADMIN — HYDRALAZINE HYDROCHLORIDE 10 MG: 20 INJECTION INTRAMUSCULAR; INTRAVENOUS at 10:09

## 2023-03-22 RX ADMIN — ENOXAPARIN SODIUM 30 MG: 100 INJECTION SUBCUTANEOUS at 22:57

## 2023-03-22 RX ADMIN — ONDANSETRON 4 MG: 2 INJECTION INTRAMUSCULAR; INTRAVENOUS at 07:50

## 2023-03-22 RX ADMIN — HYDROMORPHONE HYDROCHLORIDE 0.5 MG: 2 INJECTION, SOLUTION INTRAMUSCULAR; INTRAVENOUS; SUBCUTANEOUS at 10:21

## 2023-03-22 RX ADMIN — SODIUM CHLORIDE, POTASSIUM CHLORIDE, SODIUM LACTATE AND CALCIUM CHLORIDE: 600; 310; 30; 20 INJECTION, SOLUTION INTRAVENOUS at 14:28

## 2023-03-22 RX ADMIN — SODIUM CHLORIDE: 9 INJECTION, SOLUTION INTRAVENOUS at 07:33

## 2023-03-22 RX ADMIN — SODIUM CHLORIDE, PRESERVATIVE FREE 40 MG: 5 INJECTION INTRAVENOUS at 14:32

## 2023-03-22 RX ADMIN — ACETAMINOPHEN 650 MG: 650 SOLUTION ORAL at 06:30

## 2023-03-22 RX ADMIN — HYDROMORPHONE HYDROCHLORIDE 0.5 MG: 2 INJECTION, SOLUTION INTRAMUSCULAR; INTRAVENOUS; SUBCUTANEOUS at 09:41

## 2023-03-22 RX ADMIN — HYDROMORPHONE HYDROCHLORIDE 0.5 MG: 2 INJECTION, SOLUTION INTRAMUSCULAR; INTRAVENOUS; SUBCUTANEOUS at 10:02

## 2023-03-22 RX ADMIN — Medication 50 MG: at 07:50

## 2023-03-22 RX ADMIN — PHENYLEPHRINE HYDROCHLORIDE 100 MCG: 10 INJECTION INTRAVENOUS at 08:24

## 2023-03-22 RX ADMIN — APREPITANT 80 MG: 80 CAPSULE ORAL at 06:30

## 2023-03-22 RX ADMIN — LIDOCAINE HYDROCHLORIDE 100 MG: 20 INJECTION, SOLUTION EPIDURAL; INFILTRATION; INTRACAUDAL; PERINEURAL at 07:42

## 2023-03-22 RX ADMIN — FENTANYL CITRATE 50 MCG: 50 INJECTION, SOLUTION INTRAMUSCULAR; INTRAVENOUS at 07:42

## 2023-03-22 RX ADMIN — PROPOFOL 50 MG: 10 INJECTION, EMULSION INTRAVENOUS at 08:59

## 2023-03-22 RX ADMIN — GLYCOPYRROLATE 0.2 MG: 0.2 INJECTION INTRAMUSCULAR; INTRAVENOUS at 08:23

## 2023-03-22 RX ADMIN — ONDANSETRON 4 MG: 2 INJECTION INTRAMUSCULAR; INTRAVENOUS at 16:19

## 2023-03-22 RX ADMIN — ONDANSETRON 4 MG: 2 INJECTION INTRAMUSCULAR; INTRAVENOUS at 21:24

## 2023-03-22 RX ADMIN — Medication: at 10:26

## 2023-03-22 RX ADMIN — FENTANYL CITRATE 50 MCG: 50 INJECTION, SOLUTION INTRAMUSCULAR; INTRAVENOUS at 09:05

## 2023-03-22 RX ADMIN — HYDROMORPHONE HYDROCHLORIDE 0.5 MG: 2 INJECTION, SOLUTION INTRAMUSCULAR; INTRAVENOUS; SUBCUTANEOUS at 09:32

## 2023-03-22 RX ADMIN — SUGAMMADEX 200 MG: 100 INJECTION, SOLUTION INTRAVENOUS at 09:09

## 2023-03-22 RX ADMIN — MAGNESIUM SULFATE HEPTAHYDRATE 1 G: 500 INJECTION, SOLUTION INTRAMUSCULAR; INTRAVENOUS at 07:50

## 2023-03-22 RX ADMIN — PROPOFOL 200 MG: 10 INJECTION, EMULSION INTRAVENOUS at 07:42

## 2023-03-22 ASSESSMENT — PAIN DESCRIPTION - PAIN TYPE
TYPE: SURGICAL PAIN

## 2023-03-22 ASSESSMENT — PAIN DESCRIPTION - LOCATION
LOCATION: ABDOMEN
LOCATION: ABDOMEN
LOCATION: ABDOMEN;CHEST
LOCATION: ABDOMEN

## 2023-03-22 ASSESSMENT — PAIN - FUNCTIONAL ASSESSMENT
PAIN_FUNCTIONAL_ASSESSMENT: PREVENTS OR INTERFERES SOME ACTIVE ACTIVITIES AND ADLS
PAIN_FUNCTIONAL_ASSESSMENT: NONE - DENIES PAIN

## 2023-03-22 ASSESSMENT — PAIN DESCRIPTION - ORIENTATION
ORIENTATION: MID
ORIENTATION: MID

## 2023-03-22 ASSESSMENT — PAIN DESCRIPTION - ONSET
ONSET: ON-GOING
ONSET: ON-GOING

## 2023-03-22 ASSESSMENT — PAIN SCALES - GENERAL
PAINLEVEL_OUTOF10: 4
PAINLEVEL_OUTOF10: 0
PAINLEVEL_OUTOF10: 4
PAINLEVEL_OUTOF10: 9
PAINLEVEL_OUTOF10: 4
PAINLEVEL_OUTOF10: 9
PAINLEVEL_OUTOF10: 2
PAINLEVEL_OUTOF10: 4
PAINLEVEL_OUTOF10: 9
PAINLEVEL_OUTOF10: 1
PAINLEVEL_OUTOF10: 2
PAINLEVEL_OUTOF10: 8

## 2023-03-22 ASSESSMENT — PAIN DESCRIPTION - DESCRIPTORS
DESCRIPTORS: ACHING;SHARP
DESCRIPTORS: ACHING;SHARP

## 2023-03-22 ASSESSMENT — ENCOUNTER SYMPTOMS: SHORTNESS OF BREATH: 0

## 2023-03-22 ASSESSMENT — PAIN DESCRIPTION - FREQUENCY
FREQUENCY: CONTINUOUS
FREQUENCY: CONTINUOUS

## 2023-03-22 NOTE — H&P
[facility]) Physicians   [employer]  Malou Noyola MD, [facility] 132, [address] [facility] 28, [address] [facility]    [place]  [address] . Phone: [phone]  Fax: [phone]            Patient's History and Physical from 2023 was reviewed. Chuy Yash seen and examined. There has been no change. Patient Active Problem List    Diagnosis Date Noted    Morbid obesity with BMI of 40.0-44.9, adult ([facility] 2023     Priority: Medium    Chronic GERD 2023     Priority: Medium    Hypertension affecting pregnancy, antepartum 2018    H/O  section 2018    Chronic hypertension 2018    Mixed hyperlipidemia 2017    PCOS (polycystic ovarian syndrome) 2017    Anxiety 2017    Post traumatic stress disorder (PTSD) 2017    Severe obesity (BMI 35.0-39. 9) with comorbidity ([facility] 2017    Snoring 2017             HISTORY OF PRESENT ILLNESS:    Mireille Zapata is a very pleasant 43 y.o. with Body mass index is 35.61 kg/m². who is presenting for planned Laparoscopic Sleeve Gastrectomy for future weight loss.        Past Medical History:        Diagnosis Date    Depression     Hypertension     chronic htn- on meds    Obesity (BMI 35.0-39.9 without comorbidity) 2017    PCOS (polycystic ovarian syndrome) 2017    PONV (postoperative nausea and vomiting)     Prolonged emergence from general anesthesia     Thyroid disease     hx of hypothyroid- on synthroid    Urinary incontinence      Past Surgical History:        Procedure Laterality Date     SECTION      UPPER GASTROINTESTINAL ENDOSCOPY N/A 3/3/2023    EGD BIOPSY performed by Mary Mcqueen MD at [address] [facility]       Medications Prior to Admission:   Medications Prior to Admission: SUMAtriptan (IMITREX) 50 MG tablet,   omeprazole (PRILOSEC) 20 MG delayed release capsule, Take 1 capsule by mouth every morning (before

## 2023-03-22 NOTE — ANESTHESIA PRE PROCEDURE
Department of Anesthesiology  Preprocedure Note       Name:  Mireille Zapata   Age:  43 y.o.  :  1980                                          MRN:  [id number]         Date:  3/22/2023      Surgeon: Sarah Vazquez): Mary Mcqueen MD    Procedure: Procedure(s):  LAPAROSCOPIC SLEEVE GASTRECTOMY AND POSSIBLE OTHER INDICATED PROCEDURES    Medications prior to admission:   Prior to Admission medications    Medication Sig Start Date End Date Taking? Authorizing Provider   SUMAtriptan (IMITREX) 50 MG tablet  3/6/23   Historical Provider, MD   omeprazole (PRILOSEC) 20 MG delayed release capsule Take 1 capsule by mouth every morning (before breakfast) 3/3/23   Mary Mcqueen MD   sucralfate (CARAFATE) 1 GM/10ML suspension Take 10 mLs by mouth 4 times daily for 15 days 3/3/23 3/18/23  Mary Mcqueen MD   Biotin 2500 MCG CAPS Take by mouth daily (with breakfast)    Historical Provider, MD   Multiple Vitamins-Minerals (THERAPEUTIC MULTIVITAMIN-MINERALS) tablet Take 1 tablet by mouth daily    Historical Provider, MD   Levonorgestrel (MIRENA, 52 MG, IU) by IntraUTERine route    Historical Provider, MD       Current medications:    Current Facility-Administered Medications   Medication Dose Route Frequency Provider Last Rate Last Admin    ceFAZolin (ANCEF) 2,000 mg in sodium chloride 0.9 % 50 mL IVPB (mini-bag)  2,000 mg IntraVENous On Call to Tabatha Wallace MD        lactated ringers IV soln infusion   IntraVENous Continuous Mary Mcqueen MD        lidocaine PF 1 % injection 0.5 mL  0.5 mL IntraDERmal Once Mary Mcqueen MD        scopolamine (TRANSDERM-SCOP) transdermal patch 1 patch  1 patch TransDERmal Once Mary Mcqueen MD   1 patch at 23 0630       Allergies:     Allergies   Allergen Reactions    Latex Rash     TONGUE SWELLING    Aspartame And Phenylalanine Diarrhea    Avocado Swelling    Banana      Stomach pain    Hazelnut (Filbert) Allergy Skin Test Swelling    Kiwi Extract     Nickel

## 2023-03-22 NOTE — OP NOTE
transfusion or operation were discussed. There was concurrence with the proposed plan and informed consent was obtained. The site of surgery was properly noted/marked. The patient was taken to Operating Room, identified as Wild Td and the procedure verified as Laparoscopic Sleeve Gastrectomy & other indicated procedures. A Time Out was held and the above information confirmed. The patient was placed in the supine position and general anesthesia was induced, along with placement of orogastric tube, Venodyne boots. Lovenox SQ, Emend and IV Antibiotics given pre-operatively. All pressure points were padded properly. Patient prepped and draped in sterile fashion. A left upper quadrant incision was made and a veres needle was inserted after confirming with saline drop test.  After adequate pneumoperitoneum was obtained, a 5 mm trocar was inserted. This was followed by a endoscope which confirmed intra-abdominal placement and there was no injury on initial trocar placement. Additional ports were placed in the standard positions under direct vision. The abdomen was initially explored and noted hiatal hernia. A liver retractor was inserted through a small incision in the upper midline, lifted the liver upward, and was then secured to the OR table. The pylorus was identified and measurement was taken approximately 4-6 cm from the pylorus along the greater curvature of the stomach. The harmonic scalpel / ligasure was used to take down the attachments and short gastric vessels along the greater curve of the stomach. This was continued until all attachments were taken down and continued to the gastro-esophageal junction. A 34 [place] dilator was placed along the lesser curvature and into the pylorus.         A hiatal hernia was identified and decision was made to repair this to help improve patient's GERD and ensure sleeve would not migrate into the chest.  Began the 360-degree dissection by

## 2023-03-22 NOTE — ANESTHESIA POSTPROCEDURE EVALUATION
Department of Anesthesiology  Postprocedure Note    Patient: Darline Noriega  MRN: [id number]  YOB: 1980  Date of evaluation: 3/22/2023      Procedure Summary     Date: 03/22/23 Room / Location: [address]    Anesthesia Start: 7047 Anesthesia Stop: 1133    Procedure: LAPAROSCOPIC SLEEVE GASTRECTOMY WITH HIATAL HERNIA  REPAIR (Abdomen) Diagnosis:       Morbid obesity ([facility]      (Morbid obesity ([facility] [E66.01])    Surgeons: Edith Zavala MD Responsible Provider: Melissa Watkins MD    Anesthesia Type: general ASA Status: 3          Anesthesia Type: No value filed.     Ahmet Phase I: Ahmet Score: 8    Ahmet Phase II:        Anesthesia Post Evaluation    Patient location during evaluation: PACU  Patient participation: complete - patient participated  Level of consciousness: awake and alert  Airway patency: patent  Nausea & Vomiting: no nausea and no vomiting  Complications: no  Cardiovascular status: hemodynamically stable  Respiratory status: acceptable  Hydration status: stable  Multimodal analgesia pain management approach

## 2023-03-22 NOTE — DISCHARGE INSTRUCTIONS
Unless otherwise noted you will crush all your medications for the first 2 weeks post op and mix them with clears for the first 2 days and then with your pureed food for the remainder of the 2 weeks. If you do not tolerate your medications crushed you may quarter or cut in half and take one piece at a time. You may take your omeprazole (Prilosec) whole as it cannot be opened. After two weeks, you may start taking all your pills whole. Please make sure when taking whole pills, you do not take them all at once. Take them one at a time and allow a minute or so between each one. You may restart your biotin in 2 weeks when you are able to take whole pills again. Activity  You are encouraged to walk through the entire postoperative period as this will help with preventing clots, pneumonia and constipation. You are restricted from lifting anything greater than 10 lbs for the first 6 weeks after surgery. You may shower starting on postoperative day #2 (second day after surgery), but should not get into baths, pools and/or hot tubs until the provider releases you to do so. Driving  You should not drive for at least the first week after your surgery and/or if you are still taking pain medication. Most patients generally drive to their 2-week postoperative appointment. Constipation  Constipation can occur following surgery. This is due to anesthesia, decreased fluids and fiber in your diet, drinking protein shakes and taking pain medication. Make sure you are getting 48-64 ounces of fluids per day and walking. If you develop constipation you may use docusate sodium (Colace) or Dulcolax which are over-the-counter stool softeners and can be taken 1-2 times per day. If the stool softeners do not help in 2-3 days you can use Miralax which is an over-the-counter laxative and can be taken daily. If this does not help please call the office for further instructions.     Pain Management  Following surgery, you will have some

## 2023-03-23 ENCOUNTER — APPOINTMENT (OUTPATIENT)
Dept: GENERAL RADIOLOGY | Age: 43
DRG: 621 | End: 2023-03-23
Attending: SURGERY
Payer: COMMERCIAL

## 2023-03-23 VITALS
RESPIRATION RATE: 18 BRPM | SYSTOLIC BLOOD PRESSURE: 150 MMHG | HEIGHT: 66 IN | TEMPERATURE: 98.3 F | DIASTOLIC BLOOD PRESSURE: 92 MMHG | WEIGHT: 220 LBS | OXYGEN SATURATION: 98 % | HEART RATE: 74 BPM | BODY MASS INDEX: 35.36 KG/M2

## 2023-03-23 PROBLEM — E66.01 MORBID OBESITY WITH BMI OF 40.0-44.9, ADULT (HCC): Status: RESOLVED | Noted: 2023-01-17 | Resolved: 2023-03-23

## 2023-03-23 LAB
ANION GAP SERPL CALCULATED.3IONS-SCNC: 12 MMOL/L (ref 3–16)
BUN SERPL-MCNC: 8 MG/DL (ref 7–20)
CALCIUM SERPL-MCNC: 8.4 MG/DL (ref 8.3–10.6)
CHLORIDE SERPL-SCNC: 104 MMOL/L (ref 99–110)
CO2 SERPL-SCNC: 22 MMOL/L (ref 21–32)
CREAT SERPL-MCNC: 0.7 MG/DL (ref 0.6–1.1)
DEPRECATED RDW RBC AUTO: 12.9 % (ref 12.4–15.4)
GFR SERPLBLD CREATININE-BSD FMLA CKD-EPI: >60 ML/MIN/{1.73_M2}
GLUCOSE SERPL-MCNC: 97 MG/DL (ref 70–99)
HCT VFR BLD AUTO: 38.4 % (ref 36–48)
HGB BLD-MCNC: 12.7 G/DL (ref 12–16)
MCH RBC QN AUTO: 28.5 PG (ref 26–34)
MCHC RBC AUTO-ENTMCNC: 33 G/DL (ref 31–36)
MCV RBC AUTO: 86.5 FL (ref 80–100)
PLATELET # BLD AUTO: 250 K/UL (ref 135–450)
PMV BLD AUTO: 9.7 FL (ref 5–10.5)
POTASSIUM SERPL-SCNC: 3.9 MMOL/L (ref 3.5–5.1)
RBC # BLD AUTO: 4.44 M/UL (ref 4–5.2)
SODIUM SERPL-SCNC: 138 MMOL/L (ref 136–145)
WBC # BLD AUTO: 10.5 K/UL (ref 4–11)

## 2023-03-23 PROCEDURE — C9113 INJ PANTOPRAZOLE SODIUM, VIA: HCPCS | Performed by: SURGERY

## 2023-03-23 PROCEDURE — 74240 X-RAY XM UPR GI TRC 1CNTRST: CPT

## 2023-03-23 PROCEDURE — 6370000000 HC RX 637 (ALT 250 FOR IP): Performed by: SURGERY

## 2023-03-23 PROCEDURE — 99024 POSTOP FOLLOW-UP VISIT: CPT | Performed by: NURSE PRACTITIONER

## 2023-03-23 PROCEDURE — APPSS180 APP SPLIT SHARED TIME > 60 MINUTES: Performed by: NURSE PRACTITIONER

## 2023-03-23 PROCEDURE — 36415 COLL VENOUS BLD VENIPUNCTURE: CPT

## 2023-03-23 PROCEDURE — 2580000003 HC RX 258: Performed by: ANESTHESIOLOGY

## 2023-03-23 PROCEDURE — 6360000004 HC RX CONTRAST MEDICATION: Performed by: SURGERY

## 2023-03-23 PROCEDURE — 6360000002 HC RX W HCPCS: Performed by: SURGERY

## 2023-03-23 PROCEDURE — 94761 N-INVAS EAR/PLS OXIMETRY MLT: CPT

## 2023-03-23 PROCEDURE — 6360000002 HC RX W HCPCS: Performed by: NURSE PRACTITIONER

## 2023-03-23 PROCEDURE — 80048 BASIC METABOLIC PNL TOTAL CA: CPT

## 2023-03-23 PROCEDURE — 85027 COMPLETE CBC AUTOMATED: CPT

## 2023-03-23 PROCEDURE — 2580000003 HC RX 258: Performed by: SURGERY

## 2023-03-23 RX ORDER — KETOROLAC TROMETHAMINE 30 MG/ML
15 INJECTION, SOLUTION INTRAMUSCULAR; INTRAVENOUS EVERY 6 HOURS
Status: COMPLETED | OUTPATIENT
Start: 2023-03-23 | End: 2023-03-23

## 2023-03-23 RX ORDER — PROMETHAZINE HYDROCHLORIDE 6.25 MG/5ML
12.5 SYRUP ORAL EVERY 6 HOURS PRN
Status: DISCONTINUED | OUTPATIENT
Start: 2023-03-23 | End: 2023-03-23 | Stop reason: HOSPADM

## 2023-03-23 RX ORDER — ONDANSETRON 8 MG/1
8 TABLET, ORALLY DISINTEGRATING ORAL EVERY 8 HOURS PRN
Qty: 30 TABLET | Refills: 0 | Status: SHIPPED | OUTPATIENT
Start: 2023-03-23

## 2023-03-23 RX ORDER — ONDANSETRON 8 MG/1
8 TABLET, ORALLY DISINTEGRATING ORAL EVERY 8 HOURS PRN
Status: DISCONTINUED | OUTPATIENT
Start: 2023-03-23 | End: 2023-03-23 | Stop reason: HOSPADM

## 2023-03-23 RX ORDER — OXYCODONE HYDROCHLORIDE AND ACETAMINOPHEN 5; 325 MG/1; MG/1
1 TABLET ORAL EVERY 6 HOURS PRN
Qty: 28 TABLET | Refills: 0 | Status: SHIPPED | OUTPATIENT
Start: 2023-03-23 | End: 2023-03-30

## 2023-03-23 RX ORDER — OXYCODONE HYDROCHLORIDE AND ACETAMINOPHEN 5; 325 MG/1; MG/1
2 TABLET ORAL EVERY 4 HOURS PRN
Status: DISCONTINUED | OUTPATIENT
Start: 2023-03-23 | End: 2023-03-23 | Stop reason: HOSPADM

## 2023-03-23 RX ORDER — ONDANSETRON 8 MG/1
8 TABLET, ORALLY DISINTEGRATING ORAL EVERY 8 HOURS PRN
Qty: 30 TABLET | Refills: 2 | Status: SHIPPED | OUTPATIENT
Start: 2023-03-23

## 2023-03-23 RX ORDER — OXYCODONE HYDROCHLORIDE AND ACETAMINOPHEN 5; 325 MG/1; MG/1
1 TABLET ORAL EVERY 4 HOURS PRN
Status: DISCONTINUED | OUTPATIENT
Start: 2023-03-23 | End: 2023-03-23 | Stop reason: HOSPADM

## 2023-03-23 RX ORDER — METOCLOPRAMIDE HYDROCHLORIDE 5 MG/ML
10 INJECTION INTRAMUSCULAR; INTRAVENOUS EVERY 6 HOURS
Status: DISCONTINUED | OUTPATIENT
Start: 2023-03-23 | End: 2023-03-23 | Stop reason: HOSPADM

## 2023-03-23 RX ADMIN — HYDRALAZINE HYDROCHLORIDE 10 MG: 20 INJECTION INTRAMUSCULAR; INTRAVENOUS at 08:09

## 2023-03-23 RX ADMIN — SODIUM CHLORIDE, PRESERVATIVE FREE 10 ML: 5 INJECTION INTRAVENOUS at 08:13

## 2023-03-23 RX ADMIN — METOCLOPRAMIDE 10 MG: 5 INJECTION, SOLUTION INTRAMUSCULAR; INTRAVENOUS at 11:15

## 2023-03-23 RX ADMIN — SODIUM CHLORIDE, POTASSIUM CHLORIDE, SODIUM LACTATE AND CALCIUM CHLORIDE: 600; 310; 30; 20 INJECTION, SOLUTION INTRAVENOUS at 11:13

## 2023-03-23 RX ADMIN — ONDANSETRON 4 MG: 2 INJECTION INTRAMUSCULAR; INTRAVENOUS at 09:15

## 2023-03-23 RX ADMIN — IOHEXOL 50 ML: 350 INJECTION, SOLUTION INTRAVENOUS at 08:29

## 2023-03-23 RX ADMIN — ENOXAPARIN SODIUM 30 MG: 100 INJECTION SUBCUTANEOUS at 09:16

## 2023-03-23 RX ADMIN — SODIUM CHLORIDE, PRESERVATIVE FREE 40 MG: 5 INJECTION INTRAVENOUS at 08:09

## 2023-03-23 RX ADMIN — KETOROLAC TROMETHAMINE 15 MG: 30 INJECTION, SOLUTION INTRAMUSCULAR at 11:15

## 2023-03-23 RX ADMIN — KETOROLAC TROMETHAMINE 15 MG: 30 INJECTION, SOLUTION INTRAMUSCULAR at 16:09

## 2023-03-23 ASSESSMENT — PAIN SCALES - GENERAL
PAINLEVEL_OUTOF10: 5
PAINLEVEL_OUTOF10: 5
PAINLEVEL_OUTOF10: 3
PAINLEVEL_OUTOF10: 5

## 2023-03-23 ASSESSMENT — PAIN DESCRIPTION - DESCRIPTORS
DESCRIPTORS: ACHING
DESCRIPTORS: ACHING

## 2023-03-23 ASSESSMENT — PAIN DESCRIPTION - ORIENTATION
ORIENTATION: MID
ORIENTATION: MID

## 2023-03-23 ASSESSMENT — PAIN DESCRIPTION - LOCATION
LOCATION: ABDOMEN
LOCATION: ABDOMEN

## 2023-03-23 NOTE — CARE COORDINATION
Discharge Planning Note:    Chart reviewed and it appears that patient has minimal needs for discharge at this time. Discussed with patients nurse and requested that case management be notified if discharge needs are identified.     - Current discharge plan is for the patient to return home. Case management will continue to follow progress and update discharge plan as needed.       Risk of Readmission Score: 5%    HAILEY Olivera RN    [facility]  Phone: [phone]

## 2023-03-23 NOTE — DISCHARGE SUMMARY
The patient was admitted on day of surgery and underwent a laparoscopic sleeve gastrectomy & hiatal hernia repair. Surgery went well and the patient went to our post-op bariatric floor. Overnight, the patient had stable vitals signs, good urine output and ambulated in the halls. On POD #1 the patient underwent an UGI which revealed no leak or obstruction. Phase I diet was started and the patient tolerated well. The patient has no N/V, tolerating diet, ambulating and pain controlled on oral medication. The patient was discharged home today in stable condition. The patient will f/u in 2 weeks and was given dietary and ambulation instruction. The patient was instructed to call if there are any questions or concerns. Reviewed pathology report with patient. Patient Active Problem List    Diagnosis Date Noted    Morbid obesity with BMI of 40.0-44.9, adult ([facility] 2023     Priority: Medium    Chronic GERD 2023     Priority: Medium    Hiatal hernia 2023    S/P laparoscopic sleeve gastrectomy 2023    Hypertension affecting pregnancy, antepartum 2018    H/O  section 2018    Chronic hypertension 2018    Mixed hyperlipidemia 2017    PCOS (polycystic ovarian syndrome) 2017    Anxiety 2017    Post traumatic stress disorder (PTSD) 2017    Severe obesity (BMI 35.0-39. 9) with comorbidity ([facility] 2017    Snoring 2017

## 2023-03-23 NOTE — PROGRESS NOTES
CLINICAL PHARMACY NOTE: MEDS TO BEDS    Total # of Prescriptions Filled: 2   The following medications were delivered to the patient:  Percocet  zofran    Additional Documentation:  Patient signed, ok to deliver per nelson
EKG done 3/2/23 reviewed by Dr. Paul Perdomo new orders.
Family updated on patient status. Patient still having pain, dilaudid PCA was started. Vitals remain stable.
Holding for a room. Family to bedside.
Incentive Spirometry education and demonstration completed by Respiratory Therapy Yes      Response to education: Good     Teaching Time: 5 minutes    Minimum Predicted Vital Capacity - 593 mL. Patient's Actual Vital Capacity - 750 mL. Turning over to Nursing for routine follow-up Yes.     Comments:     Electronically signed by Edita Webb RCP on 3/22/2023 at 5:31 PM
Nursing care provided to prep patient for surgery. Patient lost 15 lbs. on pre-op diet, informed surgeon. Pre-op orders completed. Bilateral foot pneumatic sleeves applied. Teaching / education initiated regarding perioperative experience, post-op expectations and plan of care, and pain management during hospital stay. Patient verbalized understanding. The care plan and education has been reviewed and mutually agreed upon with the patient.
Order to discharge. Removed peripheral IV. Belongings checked and sent home with patient. Discharge instructions reviewed. All questions and queries answered. Patient stable at this time. Pt being transported to home by family. Pt dropped off to car by wheelchair.
Patient BP remians 160s/100s, with HR of 100-115. Anesthesia notified. BP patient's baseline, no treatment for HR right now. Will continue to monitor.
Patient ambulated to the bathroom to void. Tolerated well. Back in bed, waiting on room.
Patient off floor for upper GI fluoro scan via wheelchair.
Patient resting comfortably in preop awaiting surgery.  Soldier Lux) at bedside. Patient verbalizes understanding of procedure. Consent present, but requires surgeon's signature. H&P present from 3/6/2023 and will be updated today. Perioperative sequence of events explained to patient and she verbalizes understanding of same. Handoff report received from Corby Chau RN.
Patient to PACU from OR. Drowsy. VSS. Abd soft, x 5 lap sites intact. Some crepitus noted to left chest up into neck.
Pt transferred to the unit, A&x4 in bed, , /100, ambulated to the bed on transfer. 1720: Admission assessment complete, /82, pt A&O x4 in bed, up and voiding. Incisions to abd CDI, c/o pain to abd 2/10. Scheduled med administered per [facility], POC and education reviewed with the pt. All needs met at this time, call light in reach, will continue to monitor.
Shift assessment complete, VSS, alert and oriented X4. 5 lap sites in abd CDI. Ambulated to in hallway and to bathroom, voiding. C/O nausea and dizziness at a time. Zofran given for nausea. Abd binder and foot pump in place. NPO after midnight. The care plan and education has been reviewed and mutually agreed upon with the patient.
5-40 mL, 5-40 mL, IntraVENous, PRN  0.9 % sodium chloride infusion, , IntraVENous, PRN  diphenhydrAMINE (BENADRYL) injection 12.5 mg, 12.5 mg, IntraVENous, Q6H PRN  hydrALAZINE (APRESOLINE) injection 10 mg, 10 mg, IntraVENous, Q2H PRN  hyoscyamine (LEVSIN) 500 MCG/ML injection 250 mcg, 250 mcg, IntraVENous, Q4H PRN  labetalol (NORMODYNE;TRANDATE) injection 10 mg, 10 mg, IntraVENous, Q2H PRN  lidocaine 4 % external patch 1 patch, 1 patch, TransDERmal, Daily  pantoprazole (PROTONIX) 40 mg in sodium chloride (PF) 0.9 % 10 mL injection, 40 mg, IntraVENous, Daily  promethazine (PHENERGAN) injection 6.25 mg, 6.25 mg, IntraMUSCular, Q6H PRN  HYDROmorphone (DILAUDID) 1 mg/mL PCA, , IntraVENous, Continuous  naloxone 0.4 mg in 10 mL sodium chloride syringe, , IntraVENous, PRN  lactated ringers IV soln infusion, , IntraVENous, Continuous  sodium chloride flush 0.9 % injection 5-40 mL, 5-40 mL, IntraVENous, 2 times per day  sodium chloride flush 0.9 % injection 5-40 mL, 5-40 mL, IntraVENous, PRN  0.9 % sodium chloride infusion, , IntraVENous, PRN  HYDROmorphone HCl PF (DILAUDID) injection 0.5 mg, 0.5 mg, IntraVENous, Q3H PRN  ondansetron (ZOFRAN) injection 4 mg, 4 mg, IntraVENous, Q6H PRN  [START ON 3/25/2023] scopolamine (TRANSDERM-SCOP) transdermal patch 1 patch, 1 patch, TransDERmal, Q72H  metoclopramide (REGLAN) injection 10 mg, 10 mg, IntraVENous, Q6H PRN  enoxaparin Sodium (LOVENOX) injection 30 mg, 30 mg, SubCUTAneous, 2 times per day    Patient Active Problem List    Diagnosis Date Noted    Morbid obesity with BMI of 40.0-44.9, [facility] 2023     Priority: Medium    Chronic GERD 2023     Priority: Medium    Hiatal hernia 2023    S/P laparoscopic sleeve gastrectomy 2023    Hypertension affecting pregnancy, antepartum 2018    H/O  section 2018    Chronic hypertension 2018    Mixed hyperlipidemia 2017    PCOS (polycystic ovarian syndrome) 2017    Anxiety

## 2023-03-23 NOTE — PLAN OF CARE
Problem: Chronic Conditions and Co-morbidities  Goal: Patient's chronic conditions and co-morbidity symptoms are monitored and maintained or improved  Outcome: Progressing     Problem: Discharge Planning  Goal: Discharge to home or other facility with appropriate resources  Outcome: Progressing     Problem: ABCDS Injury Assessment  Goal: Absence of physical injury  Outcome: Progressing     Problem: Pain  Goal: Verbalizes/displays adequate comfort level or baseline comfort level  Outcome: Progressing     Problem: Safety - Adult  Goal: Free from fall injury  Outcome: Progressing

## 2023-03-23 NOTE — PLAN OF CARE
Problem: Chronic Conditions and Co-morbidities  Goal: Patient's chronic conditions and co-morbidity symptoms are monitored and maintained or improved  3/23/2023 0745 by Jodri Jerome RN  Outcome: Progressing  3/23/2023 0229 by Ora Aase, RN  Outcome: Progressing     Problem: Discharge Planning  Goal: Discharge to home or other facility with appropriate resources  3/23/2023 0745 by Jordi Jerome RN  Outcome: Progressing  3/23/2023 0229 by Ora Aase, RN  Outcome: Progressing     Problem: ABCDS Injury Assessment  Goal: Absence of physical injury  3/23/2023 0229 by Ora Aase, RN  Outcome: Progressing     Problem: Pain  Goal: Verbalizes/displays adequate comfort level or baseline comfort level  3/23/2023 0745 by Jordi Jerome RN  Outcome: Progressing  3/23/2023 0229 by Ora Aase, RN  Outcome: Progressing     Problem: Safety - Adult  Goal: Free from fall injury  3/23/2023 0745 by Jordi Jerome RN  Outcome: Progressing  3/23/2023 0229 by Ora Aase, RN  Outcome: Progressing

## 2023-03-27 ENCOUNTER — TELEPHONE (OUTPATIENT)
Dept: BARIATRICS/WEIGHT MGMT | Age: 43
End: 2023-03-27

## 2023-03-27 NOTE — TELEPHONE ENCOUNTER
Looks like overall her intake is good. Heart rate increasing could just be from eating, but most likely not to do with her valve. She is meeting her goals for both fluids and protein so would not expect any electrolyte imbalances. Next follow up at two week post-op visit.   Thank you,  CHELLE ArreolaC
Nausea/Vomiting/Reflux  [x] Using Zofran PRN for nausea/vomiting - taking before drinking shakes to prevent symptoms  [x]Taking Prilosec for reflux    [] Issues with Constipation         [x] Pt has had a BM since surgery - had the first 2 -3 days post op, loose. None yesterday              [x] Pt does not feel constipated  Pt reports some gas/bloating after the shakes. Improves with standing/moving around. Gas X but didn't notice a difference. [] Tried Colace  [] Tried Miralax          [x] Multivitamin capsule started    All questions and concerns addressed including:   Pt reports when she was eating last night for the first time, felt as if heart rate was elevated. When she checked it was at 120 bpm. Pt reports concern as she has a h/o leaky valve. She reports that heart rate went back down about 15 minutes after eating. Pt expressed concern about her care during her hospital stay during the night shift. Forwarded information to manager and advised patient someone would follow up in regards to her being able to express her concern. Patient was asked to please fill out hospital survey if she receives one in the mail.

## 2023-04-04 ENCOUNTER — OFFICE VISIT (OUTPATIENT)
Dept: BARIATRICS/WEIGHT MGMT | Age: 43
End: 2023-04-04

## 2023-04-04 VITALS
RESPIRATION RATE: 18 BRPM | BODY MASS INDEX: 34.27 KG/M2 | WEIGHT: 213.2 LBS | HEIGHT: 66 IN | DIASTOLIC BLOOD PRESSURE: 70 MMHG | OXYGEN SATURATION: 97 % | SYSTOLIC BLOOD PRESSURE: 114 MMHG | HEART RATE: 100 BPM

## 2023-04-04 DIAGNOSIS — Z98.84 S/P LAPAROSCOPIC SLEEVE GASTRECTOMY: Primary | ICD-10-CM

## 2023-04-04 PROCEDURE — 99024 POSTOP FOLLOW-UP VISIT: CPT | Performed by: SURGERY

## 2023-04-04 NOTE — PROGRESS NOTES
[address] [facility]   [employer]  Yen Levin MD, [facility] 132, [address] [facility] [address], [address] [facility]    [place]  [address] . Phone: [phone]  Fax: [phone]       The patient is a 43 y.o. female who returns today for follow up. Shane Huertas is s/p     Laparoscopic sleeve gastrectomy    We discussed how her weight affects her overall health including:  Patient Active Problem List   Diagnosis    PCOS (polycystic ovarian syndrome)    Anxiety    Post traumatic stress disorder (PTSD)    Severe obesity (BMI 35.0-39. 9) with comorbidity ([address]    Snoring    Mixed hyperlipidemia    Hypertension affecting pregnancy, antepartum    H/O  section    Chronic hypertension    Chronic GERD    Hiatal hernia    S/P laparoscopic sleeve gastrectomy        Vitals:    23 1006   BP: 114/70   Pulse: 100   Resp: 18   SpO2: 97%   Weight: 213 lb 3.2 oz (96.7 kg)   Height: 5' 6\" (1.676 m)       Lab Results   Component Value Date/Time    WBC 10.5 2023 04:56 AM    RBC 4.44 2023 04:56 AM    HGB 12.7 2023 04:56 AM    HCT 38.4 2023 04:56 AM    MCV 86.5 2023 04:56 AM    MCH 28.5 2023 04:56 AM    MCHC 33.0 2023 04:56 AM    MPV 9.7 2023 04:56 AM    NEUTOPHILPCT 58.2 2023 11:30 AM    LYMPHOPCT 30.1 2023 11:30 AM    MONOPCT 9.3 2023 11:30 AM    EOSRELPCT 1.8 2023 11:30 AM    BASOPCT 0.6 2023 11:30 AM    NEUTROABS 2.6 2023 11:30 AM    LYMPHSABS 1.3 2023 11:30 AM    MONOSABS 0.4 2023 11:30 AM    EOSABS 0.1 2023 11:30 AM     Lab Results   Component Value Date/Time     2023 04:56 AM    K 3.9 2023 04:56 AM     2023 04:56 AM    CO2 22 2023 04:56 AM    ANIONGAP 12 2023 04:56 AM    GLUCOSE 97 2023 04:56 AM    BUN 8 2023 04:56 AM    CREATININE 0.7 2023 04:56 AM    LABGLOM >60 2023 04:56 AM    GFRAA >60 2018 05:10 AM    CALCIUM 8.4 2023
Lunch: ricotta bake OR wendys chili OR potroast with mashed potatoes OR nonfat CC with PBfit chocolate PB/splenda OR light and fit greek yogurt     Snack: ricotta bake OR wendys chili OR potroast with mashed potatoes OR nonfat CC with PBfit chocolate PB/splenda OR light and fit greek yogurt     Dinner: ricotta bake OR wendys chili OR potroast with mashed potatoes OR nonfat CC with PBfit chocolate PB/splenda OR light and fit greek yogurt     Snack: Nectar shake made with 1% milk (8 oz)     Fluids: Drinking herbal tea sometimes with stevia, water, SF jello, gatorade zero     Consuming only full liquid/Pureed foods?  Yes     Amount able to eat per sittin oz     Following  rule: Yes     Food Intolerances/issues: milk/ ricotta bake     Client Concerns: none     Goals:   Continue with diet   Start Phase 3 at 3 weeks post op - handout provided and reviewed      Plan:   F/U at 6 weeks     Gina Franco RD, LD

## 2023-05-02 ENCOUNTER — OFFICE VISIT (OUTPATIENT)
Dept: BARIATRICS/WEIGHT MGMT | Age: 43
End: 2023-05-02

## 2023-05-02 VITALS
WEIGHT: 202.6 LBS | DIASTOLIC BLOOD PRESSURE: 74 MMHG | HEIGHT: 66 IN | HEART RATE: 71 BPM | OXYGEN SATURATION: 98 % | BODY MASS INDEX: 32.56 KG/M2 | RESPIRATION RATE: 18 BRPM | SYSTOLIC BLOOD PRESSURE: 122 MMHG

## 2023-05-02 DIAGNOSIS — Z98.84 S/P LAPAROSCOPIC SLEEVE GASTRECTOMY: Primary | ICD-10-CM

## 2023-05-02 PROCEDURE — 99024 POSTOP FOLLOW-UP VISIT: CPT | Performed by: SURGERY

## 2023-05-02 NOTE — PROGRESS NOTES
Dietary Assessment Note      Vitals:   Vitals:    23 0923   BP: 122/74   Pulse: 71   Resp: 18   SpO2: 98%   Weight: 202 lb 9.6 oz (91.9 kg)   Height: 5' 6\" (1.676 m)      Patient lost 10.6 lbs over past month. Total Weight Loss: 48 lbs    Labs reviewed: No new nutrition related labs      Protein intake: 60-80 grams/day, sometimes over 80     Fluid intake: 48-64 oz/day but is sometimes under this     Multivitamin/mineral intake: Fusion capsule with Fe     Calcium intake: not yet     Other: Vit C, Vit D, probiotic     Exercise: Walking     Nutrition Assessment: 6 week s/p sleeve post-op visit. Reports cooking has been challenging with her family schedule so sometimes uses grab/go options like fruit cups. Breakfast: Nectar / Rosana Crutch protein shake made with decaf coffee (8 oz)     Snack: greek yogurt OR egg beaters with some grated parmesan     Lunch: 2-3 oz tuna/chicken/tender chicken / shredded pork (crockpot) / lunchmeat turkey with wheat thins/club crackers OR gogosqueeze     Snack: 3 oz chili with lean ground beef OR another tuna pouch with CIPRIANO fruit cup     Dinner: precooked grilled chicken strips / pork with fruit/veggie pouch     Snack: blended CC with splenda with cinnamon / SF syrup OR greek yogurt with PB fit powder     Fluids: Drinking water, gatorade zero, herbal teas sometimes with stevia     Consuming only soft/mushy foods?  All soft     Amount able to eat per sittin-3 oz protein and 1/4 cup of side - 3-3.5 oz total per sitting     Following  rule: Yes     Food Intolerances/issues: milk, ricotta bake, CC sometimes     Client Concerns: constipation and hemorrhoids - has been using dulcolax sometimes; limited options with protein powder d/t manufacturing issues - interested in options from store if she cannot get brand/flavors she prefers from office    Goals:   Start Phase 4 at 6 weeks post op - handout provided and reviewed    Take 2 Calcium chews per day, taking separately from MVI

## 2023-05-02 NOTE — PROGRESS NOTES
[facility]) Physicians   [facility] [employer]  Krystin Camilo MD, [facility] 132, 1000 [facility] 28, [facility]    [place]  [address] . Phone: [phone]  Fax: [phone]       The patient is a 43 y.o. female who returns today for follow up. Yong Galvez is s/p     Laparoscopic sleeve gastrectomy    We discussed how her weight affects her overall health including:  Patient Active Problem List   Diagnosis    PCOS (polycystic ovarian syndrome)    Anxiety    Post traumatic stress disorder (PTSD)    Severe obesity (BMI 35.0-39. 9) with comorbidity ([address]    Snoring    Mixed hyperlipidemia    Hypertension affecting pregnancy, antepartum    H/O  section    Chronic hypertension    Chronic GERD    Hiatal hernia    S/P laparoscopic sleeve gastrectomy        Vitals:    23   BP: 122/74   Pulse: 71   Resp: 18   SpO2: 98%   Weight: 202 lb 9.6 oz (91.9 kg)   Height: 5' 6\" (1.676 m)       Lab Results   Component Value Date/Time    WBC 10.5 2023 04:56 AM    RBC 4.44 2023 04:56 AM    HGB 12.7 2023 04:56 AM    HCT 38.4 2023 04:56 AM    MCV 86.5 2023 04:56 AM    MCH 28.5 2023 04:56 AM    MCHC 33.0 2023 04:56 AM    MPV 9.7 2023 04:56 AM    NEUTOPHILPCT 58.2 2023 11:30 AM    LYMPHOPCT 30.1 2023 11:30 AM    MONOPCT 9.3 2023 11:30 AM    EOSRELPCT 1.8 2023 11:30 AM    BASOPCT 0.6 2023 11:30 AM    NEUTROABS 2.6 2023 11:30 AM    LYMPHSABS 1.3 2023 11:30 AM    MONOSABS 0.4 2023 11:30 AM    EOSABS 0.1 2023 11:30 AM     Lab Results   Component Value Date/Time     2023 04:56 AM    K 3.9 2023 04:56 AM     2023 04:56 AM    CO2 22 2023 04:56 AM    ANIONGAP 12 2023 04:56 AM    GLUCOSE 97 2023 04:56 AM    BUN 8 2023 04:56 AM    CREATININE 0.7 2023 04:56 AM    LABGLOM >60 2023 04:56 AM    GFRAA >60 2018 05:10 AM    CALCIUM 8.4 2023

## 2023-07-13 ENCOUNTER — OFFICE VISIT (OUTPATIENT)
Dept: BARIATRICS/WEIGHT MGMT | Age: 43
End: 2023-07-13

## 2023-07-13 VITALS
WEIGHT: 185.2 LBS | OXYGEN SATURATION: 98 % | DIASTOLIC BLOOD PRESSURE: 74 MMHG | HEIGHT: 66 IN | HEART RATE: 88 BPM | SYSTOLIC BLOOD PRESSURE: 122 MMHG | BODY MASS INDEX: 29.77 KG/M2 | RESPIRATION RATE: 18 BRPM

## 2023-07-13 DIAGNOSIS — E78.2 MIXED HYPERLIPIDEMIA: ICD-10-CM

## 2023-07-13 DIAGNOSIS — Z98.84 S/P LAPAROSCOPIC SLEEVE GASTRECTOMY: Primary | ICD-10-CM

## 2023-07-13 RX ORDER — SULFAMETHOXAZOLE AND TRIMETHOPRIM 800; 160 MG/1; MG/1
1 TABLET ORAL 2 TIMES DAILY
COMMUNITY

## 2023-07-13 NOTE — PROGRESS NOTES
Dietary Assessment Note      Vitals:   Vitals:    07/13/23 1156   Resp: 18   Height: 5' 6\" (1.676 m)   Patient lost 17.4 lbs over 2 months. Total Weight Loss: 65.4 lbs    Labs reviewed: no lab studies available for review at time of visit    Protein intake: 60-80 grams/day     Fluid intake: 48-64 oz/day    Multivitamin/mineral intake:   Fusion capsule with Fe     Calcium intake: yes fusion chews     Other:  probiotic     Exercise: yes walking, got a new bike, yardwork    Nutrition Assessment: 4 month post-op visit. Pt eating 5x per day.     Amount able to eat per sitting: 3/4 c    water with spoonful of melany seed and G zero packet  Breakfast: nectar protein shake made with decaf coffee  Snack: lite greek yogurt or half cup ff CC with NSA fruit  Lunch: lean protein like tuna/chix packet and 6 club crackers, little bit of fresh fruit/veg  Snack: fresh fruit, cukes, carrots & handful nuts  Dinner: has a rotating menu, chili with lean meat, lean burger w/o bread & non starchy veggies   Snack: yasso bar, or clio bar    Following 30/30/30 rule: yes, some snacks eats more quickly     Food Intolerances/issues: milk    Client Concerns: using melany seeds now and probiotic - dulcolax not longer needed   Pt is planning out son's bday party - discussed strategies    Goals: continue with plan    Plan: follow up in 2-3 months    Arnav Lund, MS, RD, LD

## 2023-08-30 ENCOUNTER — TELEPHONE (OUTPATIENT)
Dept: BARIATRICS/WEIGHT MGMT | Age: 43
End: 2023-08-30

## 2023-08-30 NOTE — TELEPHONE ENCOUNTER
I appreciate you calling the patient. Given symptoms began following the pork chop I am leaning to that possibly being the cause. Pork chops and steaks can be difficult for bariatric surgery patients to tolerate given the texture and consistency even when chewing thoroughly. Agree with recommendations.   Thank you,  Jay Jay Kim, CRISTEL-C

## 2023-08-30 NOTE — TELEPHONE ENCOUNTER
Pt is s/p sleeve 03/22/2023. Pt called in today with complaints of general stomach pain. Pt states that since last night after she ate dinner, it feels like she had gotten punched in the stomach and a burning sensation. Pt complains of feeling nauseous intermittently since then with increased salivation like she was going to get sick. Pt took anti nauseous medication and the nausea subsided but the pain did not. Pt call back number is [phone].

## 2023-08-30 NOTE — TELEPHONE ENCOUNTER
Returned pt call, 5 months s/p sleeve. She reports improvement in her symptoms since calling in. 24 recall includes smoked pork chop + baked beans for dinner which initiated the pain, describes as \"being elbowed in the stomach/ mid abdomen, some burning but different than reflux. \" Bfast this AM decaf coffee w/ protein shake, Snack yogurt w/ pistachios, Lunch lettuce wrap w/ turkey and lite hein. Pt states symptoms return after each eating occurrence and fade with time. Taking Prilosec daily, denies vomiting, tolerating fluids, following 30/30/30. Only recent diet change has been using Propel powder with immune support for past week. Explained this is unlikely to cause symptoms but encouraged avoiding and keeping journal of symptoms over next few days and focusing on fluids and soft foods to allow stomach to rest. Encouraged to to call back if symptoms persist or worsen. Pt verbalized understanding and has no further questions at this time.

## 2023-09-19 ENCOUNTER — OFFICE VISIT (OUTPATIENT)
Dept: BARIATRICS/WEIGHT MGMT | Age: 43
End: 2023-09-19
Payer: COMMERCIAL

## 2023-09-19 VITALS
WEIGHT: 177.6 LBS | HEART RATE: 72 BPM | HEIGHT: 66 IN | SYSTOLIC BLOOD PRESSURE: 135 MMHG | DIASTOLIC BLOOD PRESSURE: 86 MMHG | BODY MASS INDEX: 28.54 KG/M2

## 2023-09-19 DIAGNOSIS — E28.2 PCOS (POLYCYSTIC OVARIAN SYNDROME): ICD-10-CM

## 2023-09-19 DIAGNOSIS — Z98.84 S/P LAPAROSCOPIC SLEEVE GASTRECTOMY: Primary | ICD-10-CM

## 2023-09-19 DIAGNOSIS — E78.2 MIXED HYPERLIPIDEMIA: ICD-10-CM

## 2023-09-19 DIAGNOSIS — I10 CHRONIC HYPERTENSION: ICD-10-CM

## 2023-09-19 PROCEDURE — 3079F DIAST BP 80-89 MM HG: CPT | Performed by: SURGERY

## 2023-09-19 PROCEDURE — 99214 OFFICE O/P EST MOD 30 MIN: CPT | Performed by: SURGERY

## 2023-09-19 PROCEDURE — 3075F SYST BP GE 130 - 139MM HG: CPT | Performed by: SURGERY

## 2023-09-19 NOTE — PROGRESS NOTES
Dietary Assessment Note      Vitals:   Vitals:    09/19/23 0923   BP: 135/86   Pulse: 72   Weight: 177 lb 9.6 oz (80.6 kg)   Height: 5' 6\" (1.676 m)    Patient lost 7.6 lbs over 2 months. 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 Breakfast: Nectar protein shake made with decaf coffee  Snack: Almonds/pistachios + fruit/veggies OR yogurt  Lunch: Chix + black bean/corn OR Chili w/ a lot of veggies cook in   Snack: Protein bar (quest mini)  Dinner: Spaghetti squash + NSA thomas sauce + GB  Snack: Yasso bar OR [place] bar    Amount able to eat per sitting: 3/4 cup    Following 30/30/30 rule: Eating over 20-30 minutes. Waits 30 minutes between eating and drinking.     Food Intolerances/issues: milk (improving, but limits portions of dairy), fatty meat/ribs (upset GI symptoms), spicy foods, keto wraps (feel heavy)    Client Concerns: none    Goals:   - Continue current eating patterns including protein and plants with all meals and snacks  - Continue current exercise routine and increase intensity as tolerated  - Focus on NSV and goals     Plan: Follow up at 8 months post op and as needed    Mahogany Delgado RD, LD

## 2023-09-19 NOTE — PATIENT INSTRUCTIONS
Patient received dietary handouts and education.     Goals:   - Continue current eating patterns including protein and plants with all meals and snacks  - Continue current exercise routine and increase intensity as tolerated  - Focus on NSV and goals

## 2023-12-02 DIAGNOSIS — E28.2 PCOS (POLYCYSTIC OVARIAN SYNDROME): ICD-10-CM

## 2023-12-02 DIAGNOSIS — I10 CHRONIC HYPERTENSION: ICD-10-CM

## 2023-12-02 DIAGNOSIS — Z98.84 S/P LAPAROSCOPIC SLEEVE GASTRECTOMY: ICD-10-CM

## 2023-12-02 DIAGNOSIS — E78.2 MIXED HYPERLIPIDEMIA: ICD-10-CM

## 2023-12-02 LAB
25(OH)D3 SERPL-MCNC: 74.2 NG/ML
ALBUMIN SERPL-MCNC: 4.5 G/DL (ref 3.4–5)
ALBUMIN/GLOB SERPL: 1.7 {RATIO} (ref 1.1–2.2)
ALP SERPL-CCNC: 62 U/L (ref 40–129)
ALT SERPL-CCNC: 17 U/L (ref 10–40)
ANION GAP SERPL CALCULATED.3IONS-SCNC: 9 MMOL/L (ref 3–16)
AST SERPL-CCNC: 20 U/L (ref 15–37)
BASOPHILS # BLD: 0 K/UL (ref 0–0.2)
BASOPHILS NFR BLD: 0.9 %
BILIRUB SERPL-MCNC: 0.5 MG/DL (ref 0–1)
BUN SERPL-MCNC: 19 MG/DL (ref 7–20)
CALCIUM SERPL-MCNC: 9.3 MG/DL (ref 8.3–10.6)
CHLORIDE SERPL-SCNC: 104 MMOL/L (ref 99–110)
CHOLEST SERPL-MCNC: 177 MG/DL (ref 0–199)
CO2 SERPL-SCNC: 27 MMOL/L (ref 21–32)
CREAT SERPL-MCNC: 0.7 MG/DL (ref 0.6–1.1)
DEPRECATED RDW RBC AUTO: 13.2 % (ref 12.4–15.4)
EOSINOPHIL # BLD: 0.2 K/UL (ref 0–0.6)
EOSINOPHIL NFR BLD: 5.3 %
FOLATE SERPL-MCNC: >20 NG/ML (ref 4.78–24.2)
GFR SERPLBLD CREATININE-BSD FMLA CKD-EPI: >60 ML/MIN/{1.73_M2}
GLUCOSE SERPL-MCNC: 80 MG/DL (ref 70–99)
HCT VFR BLD AUTO: 41.9 % (ref 36–48)
HDLC SERPL-MCNC: 52 MG/DL (ref 40–60)
HGB BLD-MCNC: 14.2 G/DL (ref 12–16)
INR PPP: 1.11 (ref 0.84–1.16)
IRON SATN MFR SERPL: 35 % (ref 15–50)
IRON SERPL-MCNC: 96 UG/DL (ref 37–145)
LDLC SERPL CALC-MCNC: 112 MG/DL
LYMPHOCYTES # BLD: 1.5 K/UL (ref 1–5.1)
LYMPHOCYTES NFR BLD: 34.5 %
MCH RBC QN AUTO: 29.4 PG (ref 26–34)
MCHC RBC AUTO-ENTMCNC: 33.9 G/DL (ref 31–36)
MCV RBC AUTO: 86.9 FL (ref 80–100)
MONOCYTES # BLD: 0.4 K/UL (ref 0–1.3)
MONOCYTES NFR BLD: 9.7 %
NEUTROPHILS # BLD: 2.2 K/UL (ref 1.7–7.7)
NEUTROPHILS NFR BLD: 49.6 %
PLATELET # BLD AUTO: 231 K/UL (ref 135–450)
PMV BLD AUTO: 8.8 FL (ref 5–10.5)
POTASSIUM SERPL-SCNC: 4.6 MMOL/L (ref 3.5–5.1)
PROT SERPL-MCNC: 7.1 G/DL (ref 6.4–8.2)
PROTHROMBIN TIME: 14.3 SEC (ref 11.5–14.8)
RBC # BLD AUTO: 4.83 M/UL (ref 4–5.2)
SODIUM SERPL-SCNC: 140 MMOL/L (ref 136–145)
TIBC SERPL-MCNC: 275 UG/DL (ref 260–445)
TRIGL SERPL-MCNC: 66 MG/DL (ref 0–150)
TSH SERPL DL<=0.005 MIU/L-ACNC: 1.91 UIU/ML (ref 0.27–4.2)
VIT B12 SERPL-MCNC: 1361 PG/ML (ref 211–911)
VLDLC SERPL CALC-MCNC: 13 MG/DL
WBC # BLD AUTO: 4.4 K/UL (ref 4–11)

## 2023-12-04 LAB
EST. AVERAGE GLUCOSE BLD GHB EST-MCNC: 99.7 MG/DL
HBA1C MFR BLD: 5.1 %

## 2023-12-05 ENCOUNTER — OFFICE VISIT (OUTPATIENT)
Dept: BARIATRICS/WEIGHT MGMT | Age: 43
End: 2023-12-05
Payer: COMMERCIAL

## 2023-12-05 VITALS
HEART RATE: 84 BPM | HEIGHT: 66 IN | BODY MASS INDEX: 27.87 KG/M2 | RESPIRATION RATE: 18 BRPM | SYSTOLIC BLOOD PRESSURE: 128 MMHG | OXYGEN SATURATION: 99 % | DIASTOLIC BLOOD PRESSURE: 72 MMHG | WEIGHT: 173.4 LBS

## 2023-12-05 DIAGNOSIS — E78.2 MIXED HYPERLIPIDEMIA: ICD-10-CM

## 2023-12-05 DIAGNOSIS — E28.2 PCOS (POLYCYSTIC OVARIAN SYNDROME): ICD-10-CM

## 2023-12-05 DIAGNOSIS — Z98.84 S/P LAPAROSCOPIC SLEEVE GASTRECTOMY: Primary | ICD-10-CM

## 2023-12-05 PROBLEM — K21.9 CHRONIC GERD: Status: RESOLVED | Noted: 2023-01-17 | Resolved: 2023-12-05

## 2023-12-05 PROCEDURE — 99214 OFFICE O/P EST MOD 30 MIN: CPT | Performed by: SURGERY

## 2023-12-05 NOTE — PROGRESS NOTES
Dietary plan discussed and reviewed with provider.
pre- and post op diet and other recommendations are integral part to improve the chances of successful weight loss and also not following it could end with serious health complications. Some strategies discussed today include but not limited to : 30/30/30 minutes rule, food diary, avoid fast food and packing/planing ahead, & increasing exercise. Also stressed to the patient importance of taking the multivitamins as instructed, otherwise risk significant complications. Obesity as a disease is considered a high risk to patients overall health and should therefore be considered a high risk disease state. Advised the patient that not getting there weight under control, which hopefully would help with getting some of the comorbidities under control. That could increase risk of complications/worsening of those conditions on the long-term. During Covid-19 pandemic, CDC and health authorities does classify obese patients as vulnerable and high risk as well. Which makes weight loss a priority for improvement of their wellbeing and overall health. We discussed how her excess weight affects her overall health and importance of weight loss, healthy diet and active lifestyle to alleviate those co morbid conditions, otherwise risk deterioration.       - RTC in 2 months         Patient advised that its their responsibility to follow up for care, studies, referrals and/or labs ordered today. Please note that some or all of this report was generated using voice recognition software. Please notify me in case of any questions about the content of this document, as some errors in transcription may have occurred .

## 2023-12-06 LAB
A-TOCOPHEROL VIT E SERPL-MCNC: 12 MG/L (ref 5.5–18)
ANNOTATION COMMENT IMP: NORMAL
BETA+GAMMA TOCOPHEROL SERPL-MCNC: 0.8 MG/L (ref 0–6)
RETINYL PALMITATE SERPL-MCNC: 0.04 MG/L (ref 0–0.1)
VIT A SERPL-MCNC: 0.65 MG/L (ref 0.3–1.2)
VIT B1 BLD-MCNC: 194 NMOL/L (ref 70–180)

## 2023-12-12 ENCOUNTER — TELEPHONE (OUTPATIENT)
Dept: BARIATRICS/WEIGHT MGMT | Age: 43
End: 2023-12-12

## 2023-12-12 NOTE — TELEPHONE ENCOUNTER
Patient is s/p sleeve March 2023 - she called after getting dx covid positive on Sunday. Her pcp has told her to do ibuprofen / tylenol alternating. Spoke w ma advised patient this is ok for no more than 3days and not on empty stomach.

## 2024-02-06 ENCOUNTER — OFFICE VISIT (OUTPATIENT)
Dept: BARIATRICS/WEIGHT MGMT | Age: 44
End: 2024-02-06
Payer: COMMERCIAL

## 2024-02-06 VITALS
BODY MASS INDEX: 28.19 KG/M2 | SYSTOLIC BLOOD PRESSURE: 116 MMHG | DIASTOLIC BLOOD PRESSURE: 62 MMHG | RESPIRATION RATE: 18 BRPM | HEART RATE: 86 BPM | OXYGEN SATURATION: 100 % | HEIGHT: 66 IN | WEIGHT: 175.4 LBS

## 2024-02-06 DIAGNOSIS — E28.2 PCOS (POLYCYSTIC OVARIAN SYNDROME): ICD-10-CM

## 2024-02-06 DIAGNOSIS — E78.2 MIXED HYPERLIPIDEMIA: ICD-10-CM

## 2024-02-06 DIAGNOSIS — Z98.84 S/P LAPAROSCOPIC SLEEVE GASTRECTOMY: Primary | ICD-10-CM

## 2024-02-06 DIAGNOSIS — K44.9 HIATAL HERNIA: ICD-10-CM

## 2024-02-06 PROCEDURE — 99214 OFFICE O/P EST MOD 30 MIN: CPT | Performed by: SURGERY

## 2024-02-06 NOTE — PROGRESS NOTES
[facility] Physicians   [facility] [employer]  Saumya Donaldson MD, FACS, [address], [address] [place] [address] .  Phone: [phone]  Fax: [phone]            Chief Complaint   Patient presents with    Bariatrics Post Op Follow Up     10mo s/p sleeve 3/2/23           HPI:    Ailyn Tan is a very pleasant 43 y.o.  female , Body mass index is 28.31 kg/m².. And multiple medical problems who is presenting for bariatric follow up care.   Ailyn is s/p laparoscopic sleeve gastrectomy by me 3/2023. Initial Weight: 251 lbs, Weight Loss: 75 lbs.   Comes today to the clinic without any complaints. Patient denies any nausea, vomiting, fevers, chills, shortness of breath, chest pain, constipation or urinary symptoms. Denies any heartburn nor dysphagia.  Patient informed us today that they are taking the multivitamins as instructed.  Patient denies any tingling, weakness,  numbness nor any neurological symptoms.  Ailyn is feeling very well, and is very active. Patient is very pleased with the weight loss and resolution of co-morbid conditions.      Pain Assessment   Denies any abdominal pain     Past Medical History:   Diagnosis Date    Chronic GERD 2023    Chronic hypertension 2018    Depression     Hypertension     chronic htn- on meds    Obesity (BMI 35.0-39.9 without comorbidity) 2017    PCOS (polycystic ovarian syndrome) 2017    PONV (postoperative nausea and vomiting)     Prolonged emergence from general anesthesia     Thyroid disease     hx of hypothyroid- on synthroid    Urinary incontinence      Past Surgical History:   Procedure Laterality Date     SECTION      SLEEVE GASTRECTOMY N/A 3/22/2023    LAPAROSCOPIC SLEEVE GASTRECTOMY WITH HIATAL HERNIA  REPAIR performed by Saumya Donaldson MD at [facility] OR    UPPER GASTROINTESTINAL ENDOSCOPY N/A 3/3/2023    EGD BIOPSY performed by Saumya Donaldson MD at [facility] ASC ENDOSCOPY    WISEFRAIN TOOTH

## 2024-02-06 NOTE — PROGRESS NOTES
Dietary Assessment Note      Vitals:   Vitals:    24 0931   BP: 116/62   Pulse: 86   Resp: 18   SpO2: 100%   Weight: 79.6 kg (175 lb 6.4 oz)   Height: 1.676 m (5' 6\")    Patient gained 2 lbs over 2 mos.    Total Weight Loss: 88 lbs    Labs reviewed: labs reviewed, I note that    Latest Reference Range & Units 23 09:45   LDL Calculated <100 mg/dL 112 (H)   (H): Data is abnormally high    Protein intake: 60-80 grams/day     Fluid intake: 64 oz     Multivitamin/mineral intake: Fusion capsule MVI w/ Iron     Calcium intake: Fusion Calcium soft chews 2/day      Other: biotin    Exercise: tried [employer], didn't like class, member at [employer] and [employer]     Nutrition Assessment: 10 mos post-op visit.   Breakfast: coffee protein shake - nectar + sf flavoring  Snack: 10a protein bar  Lunch: lean meat + veg OR tuna + crax  Snack: fruit OR nuts   Dinner: lf chili OR pork + kraut OR burger no bun + grilled chx + veg + some tortilla   Snack: sf popscicle OR yasoo bar OR popcorn     Amount able to eat per sittin cup ~3 oz meat + 1/4 cup veg + 1/8-1/4 cup whole grain rice      Following 3030/30 rule: Eating over 20-30 minutes. Waits 30 minutes between eating and drinking.     Food Intolerances/issues:  high fat meats or things with too much oil, tortilla    Client Concerns: now that she is healed, eating to fullness and keeping focus    Goals:   - Continue diet    - Refer to [place]     Plan: F/u per provider    CHALO GIVENS, MS, RD, LD

## 2024-02-14 ENCOUNTER — TELEMEDICINE (OUTPATIENT)
Dept: BARIATRICS/WEIGHT MGMT | Age: 44
End: 2024-02-14
Payer: COMMERCIAL

## 2024-02-14 DIAGNOSIS — Z98.84 S/P LAPAROSCOPIC SLEEVE GASTRECTOMY: Primary | ICD-10-CM

## 2024-02-14 PROCEDURE — 96158 HLTH BHV IVNTJ INDIV 1ST 30: CPT | Performed by: SOCIAL WORKER

## 2024-02-14 NOTE — PROGRESS NOTES
Ailyn Tan is a 43 y.o. female who presents today for individual weight management counseling. Patient is referred to therapist by Dr. Donaldson. Patient meets criteria for S/P laparoscopic sleeve gastrectomy.  Patient reports she met her weight loss goal approximately 3 months ago and has maintained her weight. She is happy with her progress. Patient wanted to have a conversation about getting too comfortable and slipping backward.   Therapist and patient discussed self-care and small changes she can implement to continue her success.    New Goals  Patient will re-engage with support groups  Patient will track her food / water intake more thoroughly using the digital tevin of her choice.    Ailyn Tan, was evaluated through a synchronous (real-time) audio-video encounter. The patient (or guardian if applicable) is aware that this is a billable service, which includes applicable co-pays. This Virtual Visit was conducted with patient's (and/or legal guardian's) consent. Patient identification was verified, and a caregiver was present when appropriate.   The patient was located at home  Provider was located at a facility office          40 minutes was spent in direct counseling with patient    REGINA Martinez

## 2024-04-23 ENCOUNTER — OFFICE VISIT (OUTPATIENT)
Dept: BARIATRICS/WEIGHT MGMT | Age: 44
End: 2024-04-23
Payer: COMMERCIAL

## 2024-04-23 VITALS
WEIGHT: 179.4 LBS | RESPIRATION RATE: 18 BRPM | BODY MASS INDEX: 28.83 KG/M2 | HEIGHT: 66 IN | SYSTOLIC BLOOD PRESSURE: 122 MMHG | OXYGEN SATURATION: 98 % | DIASTOLIC BLOOD PRESSURE: 80 MMHG | HEART RATE: 84 BPM

## 2024-04-23 DIAGNOSIS — E78.2 MIXED HYPERLIPIDEMIA: ICD-10-CM

## 2024-04-23 DIAGNOSIS — O16.9 HYPERTENSION AFFECTING PREGNANCY, ANTEPARTUM: ICD-10-CM

## 2024-04-23 DIAGNOSIS — E28.2 PCOS (POLYCYSTIC OVARIAN SYNDROME): ICD-10-CM

## 2024-04-23 DIAGNOSIS — Z98.84 S/P LAPAROSCOPIC SLEEVE GASTRECTOMY: Primary | ICD-10-CM

## 2024-04-23 PROCEDURE — 99214 OFFICE O/P EST MOD 30 MIN: CPT | Performed by: SURGERY

## 2024-04-23 NOTE — PROGRESS NOTES
[facility] Physicians   Weight Management [employer]  Saumya Donaldson MD, FACS, [address], [address] [place] [address] .  Phone: [phone]  Fax: [phone]            Chief Complaint   Patient presents with    Bariatrics Post Op Follow Up     1yr 1mo s/p sleeve 3/22/23           HPI:    Ailyn Tan is a very pleasant 43 y.o.  female , Body mass index is 28.96 kg/m².. And multiple medical problems who is presenting for bariatric follow up care.   Ailyn is s/p laparoscopic sleeve gastrectomy by me 3/2023. Initial Weight: 251 lbs, Weight Loss: 71 lbs.   Comes today to the clinic without any complaints. Patient denies any nausea, vomiting, fevers, chills, shortness of breath, chest pain, constipation or urinary symptoms. Denies any heartburn nor dysphagia.  Patient informed us today that they are taking the multivitamins as instructed.  Patient denies any tingling, weakness,  numbness nor any neurological symptoms.  Ailyn is feeling very well, and is very active. Patient is very pleased with the weight loss and resolution of co-morbid conditions.      Pain Assessment   Denies any abdominal pain     Past Medical History:   Diagnosis Date    Chronic GERD 2023    Chronic hypertension 2018    Depression     Hypertension     chronic htn- on meds    Obesity (BMI 35.0-39.9 without comorbidity) 2017    PCOS (polycystic ovarian syndrome) 2017    PONV (postoperative nausea and vomiting)     Prolonged emergence from general anesthesia     Thyroid disease     hx of hypothyroid- on synthroid    Urinary incontinence      Past Surgical History:   Procedure Laterality Date     SECTION      SLEEVE GASTRECTOMY N/A 3/22/2023    LAPAROSCOPIC SLEEVE GASTRECTOMY WITH HIATAL HERNIA  REPAIR performed by Saumya Donaldson MD at [facility] OR    UPPER GASTROINTESTINAL ENDOSCOPY N/A 3/3/2023    EGD BIOPSY performed by Saumya Donaldson MD at [facility] ASC ENDOSCOPY    WISDOM TOOTH

## 2024-04-23 NOTE — PROGRESS NOTES
Dietary Assessment Note      Vitals:   Vitals:    04/23/24 0904   Height: 1.676 m (5' 6\")    Patient gained 4 lbs over past ~ 2.5 months.    Total Weight Loss: 71.2 lbs    Labs reviewed:   BMP:   Lab Results   Component Value Date/Time     12/02/2023 09:45 AM    K 4.6 12/02/2023 09:45 AM     12/02/2023 09:45 AM    CO2 27 12/02/2023 09:45 AM    BUN 19 12/02/2023 09:45 AM    CREATININE 0.7 12/02/2023 09:45 AM    GLUCOSE 80 12/02/2023 09:45 AM    CALCIUM 9.3 12/02/2023 09:45 AM      HgBA1c:   Lab Results   Component Value Date/Time    LABA1C 5.1 12/02/2023 09:45 AM     Vitamin D:   Lab Results   Component Value Date/Time    VITD25 74.2 12/02/2023 09:45 AM     Vitamin E:   Lab Results   Component Value Date/Time    VITEALPH 12.0 12/02/2023 09:45 AM    GAMTOC 0.8 12/02/2023 09:45 AM     Vitamin A:   Lab Results   Component Value Date/Time    TRAE 0.65 12/02/2023 09:45 AM     Vitamin B1:   Lab Results   Component Value Date/Time    CCLX5EHFMEG 194 12/02/2023 09:45 AM   4  VitB12/Folate:   Lab Results   Component Value Date/Time    [id number] 1361 12/02/2023 09:45 AM    FOLATE >20.00 12/02/2023 09:45 AM     Iron:   Lab Results   Component Value Date/Time    IRON 96 12/02/2023 09:45 AM    TIBC 275 12/02/2023 09:45 AM     Lipid:   Lab Results   Component Value Date/Time    TRIG 66 12/02/2023 09:45 AM    HDL 52 12/02/2023 09:45 AM    LDLCALC 112 12/02/2023 09:45 AM     Protein intake: ~ 100 grams per day      Fluid intake: 64 oz per day     Multivitamin/mineral intake: Fusion capsule MVI with Fe     Calcium intake: 2 Calcium soft chews     Other: biotin     Exercise: Has 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 Pt also likes to bike and will be this summer, taking more intentional steps.     Nutrition Assessment: 1 year s/p sleeve post-op visit. Pt having stress, she is finding it hard to make the right choices. Struggles with working at home/at night/on the weekends - pt planning to see

## 2024-09-25 ENCOUNTER — TELEPHONE (OUTPATIENT)
Dept: BARIATRICS/WEIGHT MGMT | Age: 44
End: 2024-09-25

## 2024-09-25 NOTE — TELEPHONE ENCOUNTER
S/P sleeve gastrectomy 3/2023 by Dr. Donaldson    Patient called in today with concerns of weight gain, feelings of impulse eating, and feelings of inability to control her eating habits. Patient shared she has sought help from a mental health provider and is planning to seek counseling. She has been prescribed an SSI, but has not started taking this medication as of this date. She has had ADHD testing, these results should be back soon.  We discussed the physiological reasons she may be feeling this way as the ghrelin hormone can return approx 18-24 months post op. Discussed ensuring she is complying with portion size, protein goals, and exercise. Advised pt to discuss her concerns with Dr. Donaldson and dietitian at her next appointment which is scheduled for 10/15/24.  Pt agrees to plan and may benefit from more frequent visits or consultation with dietitian and/or bariatrician

## 2024-10-15 ENCOUNTER — OFFICE VISIT (OUTPATIENT)
Dept: BARIATRICS/WEIGHT MGMT | Age: 44
End: 2024-10-15
Payer: COMMERCIAL

## 2024-10-15 VITALS
OXYGEN SATURATION: 98 % | HEIGHT: 66 IN | BODY MASS INDEX: 30.79 KG/M2 | SYSTOLIC BLOOD PRESSURE: 128 MMHG | WEIGHT: 191.6 LBS | RESPIRATION RATE: 18 BRPM | DIASTOLIC BLOOD PRESSURE: 80 MMHG | HEART RATE: 68 BPM

## 2024-10-15 DIAGNOSIS — Z98.84 S/P LAPAROSCOPIC SLEEVE GASTRECTOMY: Primary | ICD-10-CM

## 2024-10-15 DIAGNOSIS — E28.2 PCOS (POLYCYSTIC OVARIAN SYNDROME): ICD-10-CM

## 2024-10-15 DIAGNOSIS — E78.2 MIXED HYPERLIPIDEMIA: ICD-10-CM

## 2024-10-15 PROCEDURE — G2211 COMPLEX E/M VISIT ADD ON: HCPCS | Performed by: SURGERY

## 2024-10-15 PROCEDURE — 99214 OFFICE O/P EST MOD 30 MIN: CPT | Performed by: SURGERY

## 2024-10-15 RX ORDER — FLUOXETINE HCL 20 MG
20 CAPSULE ORAL DAILY
COMMUNITY
Start: 2024-09-28

## 2024-10-15 NOTE — PROGRESS NOTES
[facility] Physicians   Weight Management Solutions  Saumya Donaldson MD, FACS, [address], [address] [place] [address] .  Phone: [phone]  Fax: [phone]            Chief Complaint   Patient presents with    Bariatrics Post Op Follow Up     1yr6mo s/p sleeve 3/22/23           HPI:    Ailyn Tan is a very pleasant 43 y.o.  female , Body mass index is 30.93 kg/m².. And multiple medical problems who is presenting for bariatric follow up care.   Ailyn is s/p laparoscopic sleeve gastrectomy by me 3/2023. Initial Weight: 251 lbs, Weight Loss: 59 lbs.   Comes today to the clinic without any complaints. Patient denies any nausea, vomiting, fevers, chills, shortness of breath, chest pain, constipation or urinary symptoms. Denies any heartburn nor dysphagia.  Patient informed us today that they are taking the multivitamins as instructed.  Patient denies any tingling, weakness,  numbness nor any neurological symptoms.  Ailyn is feeling very well, and is very active. Patient is very pleased with the weight loss and resolution of co-morbid conditions.      Pain Assessment   Denies any abdominal pain     Past Medical History:   Diagnosis Date    Chronic GERD 2023    Chronic hypertension 2018    Depression     Hypertension     chronic htn- on meds    Obesity (BMI 35.0-39.9 without comorbidity) 2017    PCOS (polycystic ovarian syndrome) 2017    PONV (postoperative nausea and vomiting)     Prolonged emergence from general anesthesia     Thyroid disease     hx of hypothyroid- on synthroid    Urinary incontinence      Past Surgical History:   Procedure Laterality Date     SECTION      SLEEVE GASTRECTOMY N/A 3/22/2023    LAPAROSCOPIC SLEEVE GASTRECTOMY WITH HIATAL HERNIA  REPAIR performed by Saumya Donaldson MD at [facility] OR    UPPER GASTROINTESTINAL ENDOSCOPY N/A 3/3/2023    EGD BIOPSY performed by Saumya Donaldson MD at [facility] ASC ENDOSCOPY    WISDOM TOOTH

## 2024-10-15 NOTE — PROGRESS NOTES
Dietary Assessment Note      Vitals:   Vitals:    10/15/24 0844   Resp: 18   Height: 1.676 m (5' 6\")    Patient gained 12.2 lbs over past 6 months.    Total Weight Loss: 58.6 lbs    Labs reviewed:   BMP:   Lab Results   Component Value Date/Time     12/02/2023 09:45 AM    K 4.6 12/02/2023 09:45 AM     12/02/2023 09:45 AM    CO2 27 12/02/2023 09:45 AM    BUN 19 12/02/2023 09:45 AM    CREATININE 0.7 12/02/2023 09:45 AM    GLUCOSE 80 12/02/2023 09:45 AM    CALCIUM 9.3 12/02/2023 09:45 AM      HgBA1c:   Lab Results   Component Value Date/Time    LABA1C 5.1 12/02/2023 09:45 AM     Vitamin D:   Lab Results   Component Value Date/Time    VITD25 74.2 12/02/2023 09:45 AM     Vitamin E:   Lab Results   Component Value Date/Time    VITEALPH 12.0 12/02/2023 09:45 AM    GAMTOC 0.8 12/02/2023 09:45 AM     Vitamin A:   Lab Results   Component Value Date/Time    TRAE 0.65 12/02/2023 09:45 AM     Vitamin B1:   Lab Results   Component Value Date/Time    [id number] 194 12/02/2023 09:45 AM   4  VitB12/Folate:   Lab Results   Component Value Date/Time    [id number] 1361 12/02/2023 09:45 AM    FOLATE >20.00 12/02/2023 09:45 AM     Iron:   Lab Results   Component Value Date/Time    IRON 96 12/02/2023 09:45 AM    TIBC 275 12/02/2023 09:45 AM     Lipid:   Lab Results   Component Value Date/Time    TRIG 66 12/02/2023 09:45 AM    HDL 52 12/02/2023 09:45 AM     Protein intake: >80 grams/day     Fluid intake: 64 oz per day     Multivitamin/mineral intake: Fusion capsule MVI with Fe     Calcium intake: 2 Calcium soft chews     Other: biotin     Exercise: 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     Nutrition Assessment: 1 year 6 mo s/p sleeve post-op visit. Reports she has been eating more r/t stress and physical hunger. Pt has been seeing a therapist and started on Prozac to help curb the desire to eat which she feel has been helpful. Pt has been meal planning more. Reports a lot

## 2024-10-23 ENCOUNTER — OFFICE VISIT (OUTPATIENT)
Dept: BARIATRICS/WEIGHT MGMT | Age: 44
End: 2024-10-23
Payer: COMMERCIAL

## 2024-10-23 DIAGNOSIS — E66.811 OBESITY (BMI 30.0-34.9): ICD-10-CM

## 2024-10-23 DIAGNOSIS — F41.8 ANXIETY WITH DEPRESSION: Primary | ICD-10-CM

## 2024-10-23 PROCEDURE — 90791 PSYCH DIAGNOSTIC EVALUATION: CPT | Performed by: PSYCHOLOGIST

## 2024-11-12 NOTE — PROGRESS NOTES
presents as highly motivated and psychologically-minded. She continues to exhibit many positive wellness behaviors post-surgically upon which to build. She is understandably overwhelmed by a combination of family and work-related stressors. She is amenable to problem-solving ways to get back on track. She reports good family support.    Diagnosis:    Anxiety with depression  Obesity (BMI 30.0-34.9)      Plan:  Pt interventions:    Established rapport, Emphasized self-care as important for managing overall health, and Collaboratively set goals with pt re: maintaining healthy eating and wellness behaviors    Pt Behavioral Change Plan:    Patient New and/or Continuing Goals:    - Patient will attend feedback session with ADHD evaluator on 10/31 for diagnostic clarification; next follow-up with psychiatric provider on 11/20 to discuss medication efficacy    - Patient to using stimulus control and tracking to minimize her access to unhealthy snacks, and decrease incidence of impulse eating    - Patient to prioritize individual and/or family exercise for stress management and weight management     Follow-up plan:  -Pt will follow up in January with [facility] to address behavioral changes.

## 2025-04-08 ENCOUNTER — CLINICAL DOCUMENTATION (OUTPATIENT)
Dept: BARIATRICS/WEIGHT MGMT | Age: 45
End: 2025-04-08

## 2025-04-08 NOTE — PROGRESS NOTES
This eval was conducted via phone on 4/8/25 in preparation or their post-surgical visit on 4/15/25 with Dr. Donaldson.     Dietary Assessment Note      Vitals: Self-reported wt: 201lbs Patient gained 10 lbs over 6 mos. - States she had lost total 75 lbs, and has gained ~30 lbs from lowest weight.     Total Weight Loss: 48.6 lbs    Labs reviewed: no lab studies available for review at time of visit    Protein intake: 60-80 grams/day     Fluid intake: >64 oz/daywater, powerade zero, herbal tea    Multivitamin/mineral intake: yes      Calcium intake: yes      Other: biotin, probiotic    Exercise:  3X/ week 60min coaching soccer     Nutrition Assessment: 2 year post-op visit. Under the care of psychiatric nurse/therapist outpatient, started on meds to help with food impulse control. Endorses increased food noise with anxiety and depression. Naltrexone - has been on for 6 weeks and reports food noise is starting to decrease. Pt says she has 2 autistic children, so she has chips, starchy carb/sugary snacks in home that they eat. Usually avoids, but has found it difficult in recent months. She states, she in not binging, but has eaten past fullness or grazed.    B: protein shake nectar   S: [employer] protein bar  L: chx/lean beef + wheat thins/chips  S: CC + fruit OR popcorn  D: chx/lean beef + wheat thins/chips    Amount able to eat per sitting: ~3-4 oz meat + 1/4 cup veg + 1/4-1/2 cup of starch; May be larger.     Following 30/30/30 rule: yes, follows     Food Intolerances/issues: none    Client Concerns: MWM     Goals:   - Continue to assess physical vs. Mental hunger  - Utilize 9 inch plate method for structure, reduce portions of starchy carbs and add in nonstarchy veg  - Continue exercise plan    Plan: f/u per provider    CHALO GIVENS, MS, RD, LD

## 2025-04-15 ENCOUNTER — OFFICE VISIT (OUTPATIENT)
Dept: BARIATRICS/WEIGHT MGMT | Age: 45
End: 2025-04-15
Payer: COMMERCIAL

## 2025-04-15 ENCOUNTER — HOSPITAL ENCOUNTER (OUTPATIENT)
Age: 45
Discharge: HOME | End: 2025-04-15
Payer: COMMERCIAL

## 2025-04-15 VITALS
OXYGEN SATURATION: 97 % | HEART RATE: 87 BPM | HEIGHT: 66 IN | WEIGHT: 204.4 LBS | RESPIRATION RATE: 18 BRPM | DIASTOLIC BLOOD PRESSURE: 80 MMHG | SYSTOLIC BLOOD PRESSURE: 124 MMHG | BODY MASS INDEX: 32.85 KG/M2

## 2025-04-15 DIAGNOSIS — O16.9 HYPERTENSION AFFECTING PREGNANCY, ANTEPARTUM: ICD-10-CM

## 2025-04-15 DIAGNOSIS — E66.811 OBESITY (BMI 30.0-34.9): ICD-10-CM

## 2025-04-15 DIAGNOSIS — E78.2 MIXED HYPERLIPIDEMIA: ICD-10-CM

## 2025-04-15 DIAGNOSIS — Z98.84 S/P LAPAROSCOPIC SLEEVE GASTRECTOMY: ICD-10-CM

## 2025-04-15 DIAGNOSIS — Z98.84 S/P LAPAROSCOPIC SLEEVE GASTRECTOMY: Primary | ICD-10-CM

## 2025-04-15 DIAGNOSIS — E66.811 OBESITY (BMI 30.0-34.9): Primary | ICD-10-CM

## 2025-04-15 LAB
25(OH)D3 SERPL-MCNC: 75.2 NG/ML
ALBUMIN SERPL-MCNC: 4.1 G/DL (ref 3.4–5)
ALBUMIN/GLOB SERPL: 1.4 {RATIO} (ref 1.1–2.2)
ALP SERPL-CCNC: 54 U/L (ref 40–129)
ALT SERPL-CCNC: 17 U/L (ref 10–40)
ANION GAP SERPL CALCULATED.3IONS-SCNC: 9 MMOL/L (ref 3–16)
AST SERPL-CCNC: 22 U/L (ref 15–37)
BASOPHILS # BLD: 0 K/UL (ref 0–0.2)
BASOPHILS NFR BLD: 0.8 %
BILIRUB SERPL-MCNC: 0.3 MG/DL (ref 0–1)
BUN SERPL-MCNC: 19 MG/DL (ref 7–20)
CALCIUM SERPL-MCNC: 9 MG/DL (ref 8.3–10.6)
CHLORIDE SERPL-SCNC: 104 MMOL/L (ref 99–110)
CHOLEST SERPL-MCNC: 224 MG/DL (ref 0–199)
CO2 SERPL-SCNC: 25 MMOL/L (ref 21–32)
CREAT SERPL-MCNC: 0.8 MG/DL (ref 0.6–1.1)
DEPRECATED RDW RBC AUTO: 12.6 % (ref 12.4–15.4)
EOSINOPHIL # BLD: 0.2 K/UL (ref 0–0.6)
EOSINOPHIL NFR BLD: 5.5 %
EST. AVERAGE GLUCOSE BLD GHB EST-MCNC: 96.8 MG/DL
FOLATE SERPL-MCNC: 27.2 NG/ML (ref 4.78–24.2)
GFR SERPLBLD CREATININE-BSD FMLA CKD-EPI: >90 ML/MIN/{1.73_M2}
GLUCOSE SERPL-MCNC: 81 MG/DL (ref 70–99)
HBA1C MFR BLD: 5 %
HCT VFR BLD AUTO: 39.7 % (ref 36–48)
HDLC SERPL-MCNC: 58 MG/DL (ref 40–60)
HGB BLD-MCNC: 13.3 G/DL (ref 12–16)
INR PPP: 1.02 (ref 0.85–1.15)
IRON SATN MFR SERPL: 33 % (ref 15–50)
IRON SERPL-MCNC: 103 UG/DL (ref 37–145)
LDLC SERPL CALC-MCNC: 123 MG/DL
LYMPHOCYTES # BLD: 1.6 K/UL (ref 1–5.1)
LYMPHOCYTES NFR BLD: 36.2 %
MCH RBC QN AUTO: 29.9 PG (ref 26–34)
MCHC RBC AUTO-ENTMCNC: 33.5 G/DL (ref 31–36)
MCV RBC AUTO: 89.2 FL (ref 80–100)
MONOCYTES # BLD: 0.3 K/UL (ref 0–1.3)
MONOCYTES NFR BLD: 7 %
NEUTROPHILS # BLD: 2.3 K/UL (ref 1.7–7.7)
NEUTROPHILS NFR BLD: 50.5 %
PLATELET # BLD AUTO: 257 K/UL (ref 135–450)
PMV BLD AUTO: 8.4 FL (ref 5–10.5)
POTASSIUM SERPL-SCNC: 4.1 MMOL/L (ref 3.5–5.1)
PROT SERPL-MCNC: 7 G/DL (ref 6.4–8.2)
PROTHROMBIN TIME: 13.6 SEC (ref 11.9–14.9)
RBC # BLD AUTO: 4.45 M/UL (ref 4–5.2)
SODIUM SERPL-SCNC: 138 MMOL/L (ref 136–145)
TIBC SERPL-MCNC: 308 UG/DL (ref 260–445)
TRIGL SERPL-MCNC: 214 MG/DL (ref 0–150)
TSH SERPL DL<=0.005 MIU/L-ACNC: 1.29 UIU/ML (ref 0.27–4.2)
VIT B12 SERPL-MCNC: 1083 PG/ML (ref 211–911)
VLDLC SERPL CALC-MCNC: 43 MG/DL
WBC # BLD AUTO: 4.5 K/UL (ref 4–11)

## 2025-04-15 PROCEDURE — 83036 HEMOGLOBIN GLYCOSYLATED A1C: CPT

## 2025-04-15 PROCEDURE — 80053 COMPREHEN METABOLIC PANEL: CPT

## 2025-04-15 PROCEDURE — 82746 ASSAY OF FOLIC ACID SERUM: CPT

## 2025-04-15 PROCEDURE — 99214 OFFICE O/P EST MOD 30 MIN: CPT | Performed by: SURGERY

## 2025-04-15 PROCEDURE — 84425 ASSAY OF VITAMIN B-1: CPT

## 2025-04-15 PROCEDURE — 84590 ASSAY OF VITAMIN A: CPT

## 2025-04-15 PROCEDURE — 96158 HLTH BHV IVNTJ INDIV 1ST 30: CPT | Performed by: SOCIAL WORKER

## 2025-04-15 PROCEDURE — 84446 ASSAY OF VITAMIN E: CPT

## 2025-04-15 PROCEDURE — 83540 ASSAY OF IRON: CPT

## 2025-04-15 PROCEDURE — 36415 COLL VENOUS BLD VENIPUNCTURE: CPT

## 2025-04-15 PROCEDURE — 80061 LIPID PANEL: CPT

## 2025-04-15 PROCEDURE — 82607 VITAMIN B-12: CPT

## 2025-04-15 PROCEDURE — 85025 COMPLETE CBC W/AUTO DIFF WBC: CPT

## 2025-04-15 PROCEDURE — 82306 VITAMIN D 25 HYDROXY: CPT

## 2025-04-15 PROCEDURE — 85610 PROTHROMBIN TIME: CPT

## 2025-04-15 PROCEDURE — 83550 IRON BINDING TEST: CPT

## 2025-04-15 PROCEDURE — 93005 ELECTROCARDIOGRAM TRACING: CPT

## 2025-04-15 PROCEDURE — 84443 ASSAY THYROID STIM HORMONE: CPT

## 2025-04-15 PROCEDURE — G2211 COMPLEX E/M VISIT ADD ON: HCPCS | Performed by: SURGERY

## 2025-04-15 RX ORDER — BUSPIRONE HYDROCHLORIDE 15 MG/1
15 TABLET ORAL 2 TIMES DAILY
COMMUNITY
Start: 2025-03-24

## 2025-04-15 RX ORDER — NALTREXONE HYDROCHLORIDE 50 MG/1
50 TABLET, FILM COATED ORAL DAILY
COMMUNITY
Start: 2025-03-24

## 2025-04-15 RX ORDER — DROSPIRENONE AND ETHINYL ESTRADIOL 0.02-3(28)
1 KIT ORAL DAILY
COMMUNITY
Start: 2025-02-14

## 2025-04-15 NOTE — PROGRESS NOTES
09:45 AM    NEUTROABS 2.2 12/02/2023 09:45 AM    LYMPHSABS 1.5 12/02/2023 09:45 AM    MONOSABS 0.4 12/02/2023 09:45 AM    EOSABS 0.2 12/02/2023 09:45 AM     Lab Results   Component Value Date/Time     12/02/2023 09:45 AM    K 4.6 12/02/2023 09:45 AM     12/02/2023 09:45 AM    CO2 27 12/02/2023 09:45 AM    ANIONGAP 9 12/02/2023 09:45 AM    GLUCOSE 80 12/02/2023 09:45 AM    BUN 19 12/02/2023 09:45 AM    CREATININE 0.7 12/02/2023 09:45 AM    LABGLOM >60 12/02/2023 09:45 AM    GFRAA >60 08/02/2018 05:10 AM    CALCIUM 9.3 12/02/2023 09:45 AM    AGRATIO 1.7 12/02/2023 09:45 AM    BILITOT 0.5 12/02/2023 09:45 AM    ALKPHOS 62 12/02/2023 09:45 AM    ALT 17 12/02/2023 09:45 AM    AST 20 12/02/2023 09:45 AM    GLOB 2.5 08/02/2018 05:10 AM     Lab Results   Component Value Date/Time    CHOL 177 12/02/2023 09:45 AM    TRIG 66 12/02/2023 09:45 AM    HDL 52 12/02/2023 09:45 AM     No components found for: \"TSHREFLEX\"  Lab Results   Component Value Date/Time    IRON 96 12/02/2023 09:45 AM    TIBC 275 12/02/2023 09:45 AM     Lab Results   Component Value Date/Time    SKLXIQCK07 1361 12/02/2023 09:45 AM    FOLATE >20.00 12/02/2023 09:45 AM     Lab Results   Component Value Date/Time    VITD25 74.2 12/02/2023 09:45 AM     Lab Results   Component Value Date/Time    LABA1C 5.1 12/02/2023 09:45 AM    EAG 99.7 12/02/2023 09:45 AM         Current Outpatient Medications:     drospirenone-ethinyl estradiol (CECILIO) 3-0.02 MG per tablet, Take 1 tablet by mouth daily, Disp: , Rfl:     busPIRone (BUSPAR) 15 MG tablet, Take 15 mg by mouth 2 times daily, Disp: , Rfl:     naltrexone (DEPADE) 50 MG tablet, Take 1 tablet by mouth daily, Disp: , Rfl:     PROZAC 20 MG capsule, Take 1 capsule by mouth daily, Disp: , Rfl:     Probiotic Product (PROBIOTIC-10 ULTIMATE PO), Take by mouth, Disp: , Rfl:     Biotin 2500 MCG CAPS, Take by mouth daily (with breakfast), Disp: , Rfl:     ondansetron (ZOFRAN-ODT) 8 MG TBDP disintegrating tablet, Place 1

## 2025-04-15 NOTE — PROGRESS NOTES
Ailyn Tan is a 44 y.o. female who presents today for individual bariatric counseling. Pt is referred to therapist by Dr. Donaldson. Pt meets criteria for S/P laparoscopic sleeve gastrectomy    Pt has weight regain. She reports she \"eats past fullness\" and has a \"compulsion to eat foods she shouldn't. Pt has recently started taking Naltrexone to aid in compulsion suppression. She feels as though the medicine is working.    She states she has had a series of deaths of friends and family recently. Pt has not participated in grief counseling to process the losses and how they are affecting her habits.  Pt does see a psychiatric nurse on a monthly basis for medication management. She also has a counselor.  Pt's counselor recommended she contact the [facility] [[email or web address]] for assistance with her eating habits, therapist agrees with this recommendation.    Pt reports she recently started exercising again. 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    Therapist encouraged patient to contact the [facility] [[email or web address]] and to work with her counselor to address unresolved grief.    Pt will be starting MWM with Dr. Leon. She will follow up with therapist as needed.    25 minutes was spent in direct counseling with patient    REGINA Martinez

## 2025-04-16 LAB
EKG ATRIAL RATE: 58 BPM
EKG DIAGNOSIS: NORMAL
EKG P AXIS: 16 DEGREES
EKG P-R INTERVAL: 134 MS
EKG Q-T INTERVAL: 462 MS
EKG QRS DURATION: 84 MS
EKG QTC CALCULATION (BAZETT): 453 MS
EKG R AXIS: 17 DEGREES
EKG T AXIS: 10 DEGREES
EKG VENTRICULAR RATE: 58 BPM

## 2025-04-17 LAB
A-TOCOPHEROL VIT E SERPL-MCNC: 15.4 MG/L (ref 5.5–18)
ANNOTATION COMMENT IMP: NORMAL
BETA+GAMMA TOCOPHEROL SERPL-MCNC: 0.7 MG/L (ref 0–6)
RETINYL PALMITATE SERPL-MCNC: 0.05 MG/L (ref 0–0.1)
VIT A SERPL-MCNC: 0.94 MG/L (ref 0.3–1.2)

## 2025-04-18 LAB — VIT B1 BLD-MCNC: 222 NMOL/L (ref 70–180)

## 2025-06-10 ENCOUNTER — TELEMEDICINE (OUTPATIENT)
Dept: BARIATRICS/WEIGHT MGMT | Age: 45
End: 2025-06-10
Payer: COMMERCIAL

## 2025-06-10 ENCOUNTER — TELEPHONE (OUTPATIENT)
Dept: BARIATRICS/WEIGHT MGMT | Age: 45
End: 2025-06-10

## 2025-06-10 DIAGNOSIS — Z71.3 DIETARY COUNSELING AND SURVEILLANCE: ICD-10-CM

## 2025-06-10 DIAGNOSIS — Z98.84 S/P LAPAROSCOPIC SLEEVE GASTRECTOMY: ICD-10-CM

## 2025-06-10 DIAGNOSIS — E66.811 CLASS 1 OBESITY: Primary | ICD-10-CM

## 2025-06-10 PROCEDURE — 99204 OFFICE O/P NEW MOD 45 MIN: CPT | Performed by: FAMILY MEDICINE

## 2025-06-10 NOTE — PROGRESS NOTES
Reported on 4/15/2025), Disp: , Rfl:     Patient Active Problem List   Diagnosis    PCOS (polycystic ovarian syndrome)    Anxiety    Post traumatic stress disorder (PTSD)    Severe obesity (BMI 35.0-39.9) with comorbidity (HCC)    Snoring    Mixed hyperlipidemia    Hypertension affecting pregnancy, antepartum    H/O  section    Hiatal hernia    S/P laparoscopic sleeve gastrectomy    Obesity (BMI 30.0-34.9)       Past Medical History:   Diagnosis Date    Chronic GERD 2023    Chronic hypertension 2018    Depression     Hypertension     chronic htn- on meds    Obesity (BMI 35.0-39.9 without comorbidity) 2017    PCOS (polycystic ovarian syndrome) 2017    PONV (postoperative nausea and vomiting)     Prolonged emergence from general anesthesia     Thyroid disease     hx of hypothyroid- on synthroid    Urinary incontinence        Past Surgical History:   Procedure Laterality Date     SECTION      SLEEVE GASTRECTOMY N/A 3/22/2023    LAPAROSCOPIC SLEEVE GASTRECTOMY WITH HIATAL HERNIA  REPAIR performed by Saumya Donaldson MD at [facility] OR    UPPER GASTROINTESTINAL ENDOSCOPY N/A 3/3/2023    EGD BIOPSY performed by Saumya Donaldson MD at [facility] ASC ENDOSCOPY    WISDOM TOOTH EXTRACTION         Family History   Problem Relation Age of Onset    Mental Illness Mother     Elevated Lipids Mother     Arthritis Mother     Cancer Mother     Depression Mother     High Blood Pressure Mother     Miscarriages / Stillbirths Mother     Mental Illness Father     Arthritis Father     Hearing Loss Father     High Blood Pressure Father     Alcohol Abuse Father     Osteoporosis Father     Birth Defects Brother     Depression Brother     Mental Illness Brother     Diabetes Maternal Grandfather     Heart Disease Maternal Grandfather     Coronary Art Dis Maternal Grandfather     Heart Disease Paternal Grandmother     Stroke Paternal Grandmother     Vision Loss Paternal Grandmother     Mental Illness Paternal

## 2025-06-10 NOTE — TELEPHONE ENCOUNTER
Per Dr. Leon; patient advised to contact the office to schedule a 4 week follow-up appointment once Wegovy medication has been picked up

## 2025-06-11 ENCOUNTER — TELEPHONE (OUTPATIENT)
Dept: BARIATRICS/WEIGHT MGMT | Age: 45
End: 2025-06-11

## 2025-06-11 NOTE — TELEPHONE ENCOUNTER
Submitted PA for Wegovy 0.25MG/0.5ML auto-injectors  Via CM [id number] STATUS: NOT SENT    OV note is pending. Will need to submit with PA request.     If this requires a response please respond to the pool ( P [facility] MEDICATION PRE-AUTH).      Thank you please advise patient.

## 2025-06-12 ENCOUNTER — PATIENT MESSAGE (OUTPATIENT)
Dept: BARIATRICS/WEIGHT MGMT | Age: 45
End: 2025-06-12

## 2025-07-18 ENCOUNTER — TELEMEDICINE (OUTPATIENT)
Dept: BARIATRICS/WEIGHT MGMT | Age: 45
End: 2025-07-18
Payer: COMMERCIAL

## 2025-07-18 DIAGNOSIS — Z71.3 DIETARY COUNSELING AND SURVEILLANCE: ICD-10-CM

## 2025-07-18 DIAGNOSIS — E66.811 CLASS 1 OBESITY: Primary | ICD-10-CM

## 2025-07-18 PROCEDURE — 99214 OFFICE O/P EST MOD 30 MIN: CPT | Performed by: FAMILY MEDICINE

## 2025-07-18 ASSESSMENT — ENCOUNTER SYMPTOMS
CHOKING: 0
ABDOMINAL DISTENTION: 0
COUGH: 0
VOMITING: 0
EYE PAIN: 0
BLOOD IN STOOL: 0
APNEA: 0
SHORTNESS OF BREATH: 0
DIARRHEA: 0
CHEST TIGHTNESS: 0
CONSTIPATION: 0
NAUSEA: 0
PHOTOPHOBIA: 0
ABDOMINAL PAIN: 0
WHEEZING: 0

## 2025-07-18 NOTE — PROGRESS NOTES
Patient: Ailyn Tan                      Encounter Date: 7/18/2025    YOB: 1980                Age: 44 y.o.    Chief Complaint   Patient presents with    Weight Management     F/u MWM          Patient identification was verified at the start of the visit.         7/18/2025    12:48 PM   Patient-Reported Vitals   Patient-Reported Weight 201.1   Patient-Reported Height 5'6\"   Patient-Reported Systolic 121 mmHg   Patient-Reported Diastolic 78 mmHg   Patient-Reported Pulse 78   Patient-Reported Temperature 98.2         BP Readings from Last 1 Encounters:   04/15/25 124/80       BMI Readings from Last 1 Encounters:   04/15/25 32.99 kg/m²       Pulse Readings from Last 1 Encounters:   04/15/25 87          Wt Readings from Last 3 Encounters:   04/15/25 92.7 kg (204 lb 6.4 oz)   10/15/24 86.9 kg (191 lb 9.6 oz)   04/23/24 81.4 kg (179 lb 6.4 oz)        HPI: 44 y.o. female with a long-standing history of obesity s/p sleeve gastrectomy in March 2023 presents today for virtual video follow-up. she has lost 9 pounds since her last visit. Current treatment includes Wegovy 0.25 mg SC weekly. Tolerating it well. Making better dietary choices. Motivated to continue losing weight.     Medication(s): Appetite well controlled?     [x]Yes      []No    Focus:     [x]Good     []Fair     []Poor    Side effects? No        Any recent change in medication(s)? No    Exercise: []Cardio     []Resistance/strength training     [x]Other: Staying physically active, no intentional exercise     Allergies   Allergen Reactions    Latex Rash     TONGUE SWELLING    Aspartame And Phenylalanine Diarrhea    Avocado Swelling    Banana      Stomach pain    Hazelnut (Filbert) Swelling    Kiwi Extract     Nickel      Rash from long term exposure, earrings    Pineapple Swelling    Vicodin [Hydrocodone-Acetaminophen] Nausea And Vomiting         Current Outpatient Medications:     Semaglutide-Weight Management (WEGOVY) 0.5 MG/0.5ML SOAJ SC

## 2025-08-04 DIAGNOSIS — E66.811 CLASS 1 OBESITY: Primary | ICD-10-CM

## 2025-08-04 DIAGNOSIS — Z71.3 DIETARY COUNSELING AND SURVEILLANCE: ICD-10-CM

## 2025-08-13 ENCOUNTER — TELEMEDICINE (OUTPATIENT)
Dept: BARIATRICS/WEIGHT MGMT | Age: 45
End: 2025-08-13
Payer: COMMERCIAL

## 2025-08-13 DIAGNOSIS — Z71.3 DIETARY COUNSELING AND SURVEILLANCE: ICD-10-CM

## 2025-08-13 DIAGNOSIS — E66.811 CLASS 1 OBESITY: Primary | ICD-10-CM

## 2025-08-13 PROCEDURE — 99214 OFFICE O/P EST MOD 30 MIN: CPT | Performed by: FAMILY MEDICINE

## (undated) DEVICE — SYRINGE MED 10ML TRNSLUC BRL PLUNG BLK MRK POLYPR CTRL

## (undated) DEVICE — APPLICATOR LAP 35 CM 2 RIGID VISTASEAL

## (undated) DEVICE — MOUTHPIECE ENDOSCP L CTRL OPN AND SIDE PORTS DISP

## (undated) DEVICE — DEVICE SUT 0 L48IN GRN POLY BRAID LD UNIT DISP SURGDAC

## (undated) DEVICE — ENDOSCOPIC KIT 6X3/16 FT COLON W/ 1.1 OZ 2 GWN W/O BRSH

## (undated) DEVICE — SUTURE VCRL + SZ 4-0 L18IN ABSRB UD L19MM PS-2 3/8 CIR PRIM VCP496H

## (undated) DEVICE — TRUE CONTENT TO BE POPULATED AS PART OF REBRANDING: Brand: ARGYLE

## (undated) DEVICE — SOLUTION IRRIG 1000ML 0.9% SOD CHL USP POUR PLAS BTL

## (undated) DEVICE — TROCAR SLEEVE: Brand: KII ® LOW PROFILE SLEEVE

## (undated) DEVICE — LOTION PREP REMV 5OZ IODO CLR TINC OF BENZ DURAPREP

## (undated) DEVICE — ADHESIVE SKIN CLSR 0.7ML TOP DERMBND ADV

## (undated) DEVICE — TROCAR: Brand: KII OPTICAL ACCESS SYSTEM

## (undated) DEVICE — TROCAR: Brand: KII FIOS FIRST ENTRY

## (undated) DEVICE — NEEDLE INSUF L150MM DIA2MM DISP FOR PNEUMOPERI ENDOPATH

## (undated) DEVICE — NEEDLE,22GX1.5",REG,BEVEL: Brand: MEDLINE

## (undated) DEVICE — Device

## (undated) DEVICE — BW-412T DISP COMBO CLEANING BRUSH: Brand: SINGLE USE COMBINATION CLEANING BRUSH

## (undated) DEVICE — TROCARS: Brand: KII® OPTICAL ACCESS SYSTEM

## (undated) DEVICE — SHEET,DRAPE,40X58,STERILE: Brand: MEDLINE

## (undated) DEVICE — STERILE POLYISOPRENE POWDER-FREE SURGICAL GLOVES: Brand: PROTEXIS

## (undated) DEVICE — MERCY FAIRFIELD TURNOVER KIT: Brand: MEDLINE INDUSTRIES, INC.

## (undated) DEVICE — BLANKET WRM W29.9XL79.1IN UP BODY FORC AIR MISTRAL-AIR

## (undated) DEVICE — 30978 SEE SHARP - ENHANCED INTRAOPERATIVE LAPAROSCOPE CLEANING & DEFOGGING: Brand: 30978 SEE SHARP - ENHANCED INTRAOPERATIVE LAPAROSCOPE CLEANING & DEFOGGING

## (undated) DEVICE — SHEET,DRAPE,53X77,STERILE: Brand: MEDLINE

## (undated) DEVICE — CRADLE ANK AND FT ELEV FLAT END POLY FOAM W/ VENT H NEUT

## (undated) DEVICE — SOLUTION IV IRRIG WATER 500ML POUR BRL ST 2F7113

## (undated) DEVICE — GOWN,AURORA,NONREINF,RAGLAN,XXL,STERILE: Brand: MEDLINE

## (undated) DEVICE — DRAPE,LAP,CHOLE,W/TROUGHS,STERILE: Brand: MEDLINE

## (undated) DEVICE — ARM CRADLE: Brand: DEVON

## (undated) DEVICE — APPLICATOR PREP 26ML 0.7% IOD POVACRYLEX 74% ISO ALC ST

## (undated) DEVICE — SHEARS ENDOSCP HARM 36CM ULTRASONIC CRV TIP UPGRD

## (undated) DEVICE — VALVE SUCTION AIR H2O SET ORCA POD + DISP

## (undated) DEVICE — LAPAROSCOPY PACK: Brand: MEDLINE INDUSTRIES, INC.

## (undated) DEVICE — AIR SHEET,LAT,COMFORT GLIDE, BLEND 40X80: Brand: MEDLINE

## (undated) DEVICE — SUTURE VCRL PLUS SZ 0 54IN TIE VCP608H

## (undated) DEVICE — DEVICE SUT W/ SZ 0 L48IN VLT POLYSRB SUT DISP ES-9 ENDO

## (undated) DEVICE — PASSIVE LAPSCP FLTR W/ 1/4INX24 TBNG AND M LUER LCK FIT - U

## (undated) DEVICE — RELOAD STPL 3.5MM L60MM 0DEG UNIV TISS PUR TI 6 ROW LIN

## (undated) DEVICE — LAPAROSCOPIC SCISSORS: Brand: EPIX LAPAROSCOPIC SCISSORS

## (undated) DEVICE — FORCEPS BX L240CM WRK CHN 2.8MM STD CAP W/ NDL MIC MESH

## (undated) DEVICE — SPONGE,LAP,4"X18",XR,ST,5/PK,40PK/CS: Brand: MEDLINE INDUSTRIES, INC.

## (undated) DEVICE — BOWL MED L 32OZ PLAS W/ MOLD GRAD EZ OPN PEEL PCH

## (undated) DEVICE — AIR/WATER CLEANING ADAPTER FOR OLYMPUS® GI ENDOSCOPE: Brand: BULLDOG®

## (undated) DEVICE — KIT BARIATRIC SIGNIA TRISTAPLE 1